# Patient Record
Sex: MALE | Race: BLACK OR AFRICAN AMERICAN | Employment: UNEMPLOYED | URBAN - METROPOLITAN AREA
[De-identification: names, ages, dates, MRNs, and addresses within clinical notes are randomized per-mention and may not be internally consistent; named-entity substitution may affect disease eponyms.]

---

## 2019-07-23 ENCOUNTER — HOSPITAL ENCOUNTER (OUTPATIENT)
Dept: LAB | Facility: CLINIC | Age: 69
Discharge: HOME | End: 2019-07-23
Attending: ORTHOPAEDIC SURGERY | Admitting: ORTHOPAEDIC SURGERY
Payer: MEDICARE

## 2019-07-23 DIAGNOSIS — Z96.659 S/P TOTAL KNEE REPLACEMENT: ICD-10-CM

## 2019-07-23 DIAGNOSIS — Z00.00 ROUTINE GENERAL MEDICAL EXAMINATION AT A HEALTH CARE FACILITY: Primary | ICD-10-CM

## 2019-07-23 LAB
BASOPHILS # BLD AUTO: 0.1 10E9/L (ref 0–0.2)
BASOPHILS NFR BLD AUTO: 0.6 %
CRP SERPL-MCNC: 15.6 MG/L (ref 0–8)
DIFFERENTIAL METHOD BLD: ABNORMAL
EOSINOPHIL # BLD AUTO: 0.3 10E9/L (ref 0–0.7)
EOSINOPHIL NFR BLD AUTO: 3.1 %
ERYTHROCYTE [DISTWIDTH] IN BLOOD BY AUTOMATED COUNT: 22.2 % (ref 10–15)
ERYTHROCYTE [SEDIMENTATION RATE] IN BLOOD BY WESTERGREN METHOD: 15 MM/H (ref 0–20)
HCT VFR BLD AUTO: 38.8 % (ref 40–53)
HGB BLD-MCNC: 11.4 G/DL (ref 13.3–17.7)
IMM GRANULOCYTES # BLD: 0 10E9/L (ref 0–0.4)
IMM GRANULOCYTES NFR BLD: 0.3 %
LYMPHOCYTES # BLD AUTO: 2.7 10E9/L (ref 0.8–5.3)
LYMPHOCYTES NFR BLD AUTO: 28.6 %
MCH RBC QN AUTO: 23.2 PG (ref 26.5–33)
MCHC RBC AUTO-ENTMCNC: 29.4 G/DL (ref 31.5–36.5)
MCV RBC AUTO: 79 FL (ref 78–100)
MONOCYTES # BLD AUTO: 0.7 10E9/L (ref 0–1.3)
MONOCYTES NFR BLD AUTO: 7.2 %
NEUTROPHILS # BLD AUTO: 5.6 10E9/L (ref 1.6–8.3)
NEUTROPHILS NFR BLD AUTO: 60.2 %
NRBC # BLD AUTO: 0 10*3/UL
NRBC BLD AUTO-RTO: 0 /100
PLATELET # BLD AUTO: 237 10E9/L (ref 150–450)
RBC # BLD AUTO: 4.91 10E12/L (ref 4.4–5.9)
WBC # BLD AUTO: 9.3 10E9/L (ref 4–11)

## 2019-07-23 PROCEDURE — 85652 RBC SED RATE AUTOMATED: CPT | Performed by: ORTHOPAEDIC SURGERY

## 2019-07-23 PROCEDURE — 36415 COLL VENOUS BLD VENIPUNCTURE: CPT | Performed by: ORTHOPAEDIC SURGERY

## 2019-07-23 PROCEDURE — 86140 C-REACTIVE PROTEIN: CPT | Performed by: ORTHOPAEDIC SURGERY

## 2019-07-23 PROCEDURE — 85025 COMPLETE CBC W/AUTO DIFF WBC: CPT | Performed by: ORTHOPAEDIC SURGERY

## 2019-08-01 ENCOUNTER — HOSPITAL ENCOUNTER (INPATIENT)
Facility: CLINIC | Age: 69
Setting detail: SURGERY ADMIT
End: 2019-08-01
Attending: ORTHOPAEDIC SURGERY | Admitting: ORTHOPAEDIC SURGERY
Payer: MEDICARE

## 2019-09-10 RX ORDER — IPRATROPIUM BROMIDE AND ALBUTEROL SULFATE 2.5; .5 MG/3ML; MG/3ML
1 SOLUTION RESPIRATORY (INHALATION) EVERY 4 HOURS PRN
COMMUNITY
End: 2019-10-01 | Stop reason: HOSPADM

## 2019-09-10 RX ORDER — DIPHENHYDRAMINE HCL 25 MG
25 TABLET ORAL EVERY 6 HOURS PRN
COMMUNITY
End: 2019-10-01 | Stop reason: HOSPADM

## 2019-09-10 RX ORDER — HYDROCODONE BITARTRATE AND ACETAMINOPHEN 5; 325 MG/1; MG/1
1-2 TABLET ORAL EVERY 6 HOURS PRN
COMMUNITY
End: 2019-10-01 | Stop reason: HOSPADM

## 2019-09-27 VITALS — WEIGHT: 275 LBS | HEIGHT: 69 IN | BODY MASS INDEX: 40.73 KG/M2

## 2019-09-27 ASSESSMENT — MIFFLIN-ST. JEOR: SCORE: 2007.77

## 2019-09-28 NOTE — PHARMACY-ADMISSION MEDICATION HISTORY
Medication reconciliation interview completed by pre-admitting nurse, reviewed by pharmacy. No further clarifications needed.     Last Reviewed by Sandie Winter RN on 9/10/2019 at 9:55 AM    Prior to Admission medications    Medication Sig Last Dose Taking? Auth Provider   clonazePAM (KLONOPIN) 1 MG tablet Take 1 mg by mouth 3 times daily as needed for anxiety  Yes Reported, Patient   diphenhydrAMINE (BENADRYL) 25 MG tablet Take 25 mg by mouth every 6 hours as needed for itching or allergies  Yes Reported, Patient   HYDROcodone-acetaminophen (NORCO) 5-325 MG tablet Take 1-2 tablets by mouth every 6 hours as needed for severe pain  Yes Reported, Patient   ipratropium - albuterol 0.5 mg/2.5 mg/3 mL (DUONEB) 0.5-2.5 (3) MG/3ML neb solution Take 1 vial by nebulization every 4 hours as needed for shortness of breath / dyspnea or wheezing  Yes Reported, Patient   Ipratropium-Albuterol (COMBIVENT RESPIMAT)  MCG/ACT inhaler Inhale 1 puff into the lungs 4 times daily  Yes Reported, Patient   METOPROLOL TARTRATE PO Take 50 mg by mouth 2 times daily  Yes Reported, Patient   triamterene-hydrochlorothiazide (MAXZIDE-25) 37.5-25 MG per tablet Take 1 tablet by mouth daily  Yes Reported, Patient

## 2019-10-31 RX ORDER — BUDESONIDE AND FORMOTEROL FUMARATE DIHYDRATE 80; 4.5 UG/1; UG/1
2 AEROSOL RESPIRATORY (INHALATION) 2 TIMES DAILY
COMMUNITY

## 2019-10-31 RX ORDER — HYDROCODONE BITARTRATE AND ACETAMINOPHEN 5; 325 MG/1; MG/1
1 TABLET ORAL EVERY 6 HOURS PRN
Status: ON HOLD | COMMUNITY
End: 2019-11-13

## 2019-10-31 RX ORDER — GUAIFENESIN AND DEXTROMETHORPHAN HYDROBROMIDE 600; 30 MG/1; MG/1
1 TABLET, EXTENDED RELEASE ORAL EVERY 12 HOURS
COMMUNITY

## 2019-11-08 ASSESSMENT — MIFFLIN-ST. JEOR: SCORE: 2029.31

## 2019-11-08 NOTE — PHARMACY-ADMISSION MEDICATION HISTORY
Admission medication history interview status for this patient is complete. See [facility] admission navigator for allergy information, prior to admission medications and immunization status.     PTA meds completed by pre-admitting nurse, Jimena Sanchez RN, and reviewed by pharmacy.      Prior to Admission medications    Medication Sig Last Dose Taking? Auth Provider   budesonide-formoterol (SYMBICORT) 80-4.5 MCG/ACT Inhaler Inhale 2 puffs into the lungs 2 times daily And may take additional 1-2 puffs per day as needed  Yes Reported, Patient   clonazePAM (KLONOPIN) 1 MG tablet Take 1 mg by mouth 3 times daily as needed for anxiety  Yes Reported, Patient   dextromethorphan-guaiFENesin (MUCINEX DM)  MG 12 hr tablet Take 1 tablet by mouth every 12 hours  Yes Reported, Patient   HYDROcodone-acetaminophen (NORCO) 5-325 MG tablet Take 1 tablet by mouth every 6 hours as needed for severe pain  Yes Reported, Patient   METOPROLOL TARTRATE PO Take 50 mg by mouth 2 times daily  Yes Reported, Patient   triamterene-hydrochlorothiazide (MAXZIDE-25) 37.5-25 MG per tablet Take 1 tablet by mouth daily  Yes Reported, Patient            No

## 2019-11-12 ENCOUNTER — ANESTHESIA (OUTPATIENT)
Dept: SURGERY | Facility: CLINIC | Age: 69
DRG: 467 | End: 2019-11-12
Payer: MEDICARE

## 2019-11-12 ENCOUNTER — HOSPITAL ENCOUNTER (INPATIENT)
Facility: CLINIC | Age: 69
LOS: 3 days | Discharge: HOME | DRG: 467 | End: 2019-11-15
Attending: ORTHOPAEDIC SURGERY | Admitting: ORTHOPAEDIC SURGERY
Payer: MEDICARE

## 2019-11-12 ENCOUNTER — APPOINTMENT (OUTPATIENT)
Dept: GENERAL RADIOLOGY | Facility: CLINIC | Age: 69
DRG: 467 | End: 2019-11-12
Attending: ORTHOPAEDIC SURGERY
Payer: MEDICARE

## 2019-11-12 ENCOUNTER — ANESTHESIA EVENT (OUTPATIENT)
Dept: SURGERY | Facility: CLINIC | Age: 69
DRG: 467 | End: 2019-11-12
Payer: MEDICARE

## 2019-11-12 DIAGNOSIS — D62 ANEMIA DUE TO BLOOD LOSS, ACUTE: ICD-10-CM

## 2019-11-12 DIAGNOSIS — Z96.651 STATUS POST TOTAL RIGHT KNEE REPLACEMENT: Primary | ICD-10-CM

## 2019-11-12 DIAGNOSIS — F41.9 ANXIETY: ICD-10-CM

## 2019-11-12 PROBLEM — Z96.659 S/P TOTAL KNEE ARTHROPLASTY: Status: ACTIVE | Noted: 2019-11-12

## 2019-11-12 LAB
ABO + RH BLD: NORMAL
ABO + RH BLD: NORMAL
BLD GP AB SCN SERPL QL: NORMAL
BLOOD BANK CMNT PATIENT-IMP: NORMAL
CREAT SERPL-MCNC: 1.18 MG/DL (ref 0.66–1.25)
GFR SERPL CREATININE-BSD FRML MDRD: 63 ML/MIN/{1.73_M2}
GRAM STN SPEC: NORMAL
HGB BLD-MCNC: 10.4 G/DL (ref 13.3–17.7)
PLATELET # BLD AUTO: 300 10E9/L (ref 150–450)
SPECIMEN EXP DATE BLD: NORMAL
SPECIMEN SOURCE: NORMAL

## 2019-11-12 PROCEDURE — C1776 JOINT DEVICE (IMPLANTABLE): HCPCS | Performed by: ORTHOPAEDIC SURGERY

## 2019-11-12 PROCEDURE — 71000012 ZZH RECOVERY PHASE 1 LEVEL 1 FIRST HR: Performed by: ORTHOPAEDIC SURGERY

## 2019-11-12 PROCEDURE — 87070 CULTURE OTHR SPECIMN AEROBIC: CPT | Performed by: ORTHOPAEDIC SURGERY

## 2019-11-12 PROCEDURE — 36000069 ZZH SURGERY LEVEL 5 EA 15 ADDTL MIN: Performed by: ORTHOPAEDIC SURGERY

## 2019-11-12 PROCEDURE — 86850 RBC ANTIBODY SCREEN: CPT | Performed by: ANESTHESIOLOGY

## 2019-11-12 PROCEDURE — 25000128 H RX IP 250 OP 636: Performed by: NURSE ANESTHETIST, CERTIFIED REGISTERED

## 2019-11-12 PROCEDURE — 37000008 ZZH ANESTHESIA TECHNICAL FEE, 1ST 30 MIN: Performed by: ORTHOPAEDIC SURGERY

## 2019-11-12 PROCEDURE — 25000132 ZZH RX MED GY IP 250 OP 250 PS 637: Mod: GY | Performed by: PHYSICIAN ASSISTANT

## 2019-11-12 PROCEDURE — 0SPC0JZ REMOVAL OF SYNTHETIC SUBSTITUTE FROM RIGHT KNEE JOINT, OPEN APPROACH: ICD-10-PCS | Performed by: ORTHOPAEDIC SURGERY

## 2019-11-12 PROCEDURE — 25000125 ZZHC RX 250: Performed by: NURSE ANESTHETIST, CERTIFIED REGISTERED

## 2019-11-12 PROCEDURE — 25800025 ZZH RX 258: Performed by: ORTHOPAEDIC SURGERY

## 2019-11-12 PROCEDURE — 88331 PATH CONSLTJ SURG 1 BLK 1SPC: CPT | Performed by: ORTHOPAEDIC SURGERY

## 2019-11-12 PROCEDURE — 88305 TISSUE EXAM BY PATHOLOGIST: CPT | Mod: 26 | Performed by: ORTHOPAEDIC SURGERY

## 2019-11-12 PROCEDURE — 36415 COLL VENOUS BLD VENIPUNCTURE: CPT | Performed by: ANESTHESIOLOGY

## 2019-11-12 PROCEDURE — 87075 CULTR BACTERIA EXCEPT BLOOD: CPT | Performed by: ORTHOPAEDIC SURGERY

## 2019-11-12 PROCEDURE — 87176 TISSUE HOMOGENIZATION CULTR: CPT | Performed by: ORTHOPAEDIC SURGERY

## 2019-11-12 PROCEDURE — 25800030 ZZH RX IP 258 OP 636: Performed by: ANESTHESIOLOGY

## 2019-11-12 PROCEDURE — 36000067 ZZH SURGERY LEVEL 5 1ST 30 MIN: Performed by: ORTHOPAEDIC SURGERY

## 2019-11-12 PROCEDURE — 88332 PATH CONSLTJ SURG EA ADD BLK: CPT | Mod: 26 | Performed by: ORTHOPAEDIC SURGERY

## 2019-11-12 PROCEDURE — 25000128 H RX IP 250 OP 636: Performed by: ORTHOPAEDIC SURGERY

## 2019-11-12 PROCEDURE — 86900 BLOOD TYPING SEROLOGIC ABO: CPT | Performed by: ANESTHESIOLOGY

## 2019-11-12 PROCEDURE — 25000128 H RX IP 250 OP 636: Performed by: ANESTHESIOLOGY

## 2019-11-12 PROCEDURE — 25000132 ZZH RX MED GY IP 250 OP 250 PS 637: Mod: GY | Performed by: ORTHOPAEDIC SURGERY

## 2019-11-12 PROCEDURE — 27211024 ZZHC OR SUPPLY OTHER OPNP: Performed by: ORTHOPAEDIC SURGERY

## 2019-11-12 PROCEDURE — 88331 PATH CONSLTJ SURG 1 BLK 1SPC: CPT | Mod: 26 | Performed by: ORTHOPAEDIC SURGERY

## 2019-11-12 PROCEDURE — 37000009 ZZH ANESTHESIA TECHNICAL FEE, EACH ADDTL 15 MIN: Performed by: ORTHOPAEDIC SURGERY

## 2019-11-12 PROCEDURE — 0SRC0J9 REPLACEMENT OF RIGHT KNEE JOINT WITH SYNTHETIC SUBSTITUTE, CEMENTED, OPEN APPROACH: ICD-10-PCS | Performed by: ORTHOPAEDIC SURGERY

## 2019-11-12 PROCEDURE — 40000986 XR KNEE PORT RT 1/2 VW: Mod: RT

## 2019-11-12 PROCEDURE — 87205 SMEAR GRAM STAIN: CPT | Performed by: ORTHOPAEDIC SURGERY

## 2019-11-12 PROCEDURE — 71000013 ZZH RECOVERY PHASE 1 LEVEL 1 EA ADDTL HR: Performed by: ORTHOPAEDIC SURGERY

## 2019-11-12 PROCEDURE — 12000000 ZZH R&B MED SURG/OB

## 2019-11-12 PROCEDURE — 82565 ASSAY OF CREATININE: CPT | Performed by: ANESTHESIOLOGY

## 2019-11-12 PROCEDURE — 88305 TISSUE EXAM BY PATHOLOGIST: CPT | Performed by: ORTHOPAEDIC SURGERY

## 2019-11-12 PROCEDURE — 88332 PATH CONSLTJ SURG EA ADD BLK: CPT | Performed by: ORTHOPAEDIC SURGERY

## 2019-11-12 PROCEDURE — 86901 BLOOD TYPING SEROLOGIC RH(D): CPT | Performed by: ANESTHESIOLOGY

## 2019-11-12 PROCEDURE — 85018 HEMOGLOBIN: CPT | Performed by: ANESTHESIOLOGY

## 2019-11-12 PROCEDURE — 85049 AUTOMATED PLATELET COUNT: CPT | Performed by: ANESTHESIOLOGY

## 2019-11-12 PROCEDURE — 25800030 ZZH RX IP 258 OP 636: Performed by: NURSE ANESTHETIST, CERTIFIED REGISTERED

## 2019-11-12 PROCEDURE — 25800030 ZZH RX IP 258 OP 636: Performed by: ORTHOPAEDIC SURGERY

## 2019-11-12 PROCEDURE — 27210794 ZZH OR GENERAL SUPPLY STERILE: Performed by: ORTHOPAEDIC SURGERY

## 2019-11-12 PROCEDURE — 27810169 ZZH OR IMPLANT GENERAL: Performed by: ORTHOPAEDIC SURGERY

## 2019-11-12 PROCEDURE — 40000171 ZZH STATISTIC PRE-PROCEDURE ASSESSMENT III: Performed by: ORTHOPAEDIC SURGERY

## 2019-11-12 DEVICE — IMPLANTABLE DEVICE: Type: IMPLANTABLE DEVICE | Site: KNEE | Status: FUNCTIONAL

## 2019-11-12 DEVICE — BONE CEMENT SIMPLEX W/TOBRAMYCIN 6197-9-001: Type: IMPLANTABLE DEVICE | Site: KNEE | Status: FUNCTIONAL

## 2019-11-12 RX ORDER — LIDOCAINE HYDROCHLORIDE 10 MG/ML
INJECTION, SOLUTION INFILTRATION; PERINEURAL PRN
Status: DISCONTINUED | OUTPATIENT
Start: 2019-11-12 | End: 2019-11-12

## 2019-11-12 RX ORDER — LIDOCAINE 40 MG/G
CREAM TOPICAL
Status: DISCONTINUED | OUTPATIENT
Start: 2019-11-12 | End: 2019-11-15 | Stop reason: HOSPADM

## 2019-11-12 RX ORDER — ONDANSETRON 2 MG/ML
4 INJECTION INTRAMUSCULAR; INTRAVENOUS EVERY 6 HOURS PRN
Status: DISCONTINUED | OUTPATIENT
Start: 2019-11-12 | End: 2019-11-15 | Stop reason: HOSPADM

## 2019-11-12 RX ORDER — LABETALOL 20 MG/4 ML (5 MG/ML) INTRAVENOUS SYRINGE
10
Status: DISCONTINUED | OUTPATIENT
Start: 2019-11-12 | End: 2019-11-12 | Stop reason: HOSPADM

## 2019-11-12 RX ORDER — EPHEDRINE SULFATE 50 MG/ML
INJECTION, SOLUTION INTRAMUSCULAR; INTRAVENOUS; SUBCUTANEOUS PRN
Status: DISCONTINUED | OUTPATIENT
Start: 2019-11-12 | End: 2019-11-12

## 2019-11-12 RX ORDER — ONDANSETRON 2 MG/ML
INJECTION INTRAMUSCULAR; INTRAVENOUS PRN
Status: DISCONTINUED | OUTPATIENT
Start: 2019-11-12 | End: 2019-11-12

## 2019-11-12 RX ORDER — CLONAZEPAM 1 MG/1
1 TABLET ORAL 3 TIMES DAILY PRN
Status: DISCONTINUED | OUTPATIENT
Start: 2019-11-12 | End: 2019-11-15 | Stop reason: HOSPADM

## 2019-11-12 RX ORDER — GLYCOPYRROLATE 0.2 MG/ML
INJECTION, SOLUTION INTRAMUSCULAR; INTRAVENOUS PRN
Status: DISCONTINUED | OUTPATIENT
Start: 2019-11-12 | End: 2019-11-12

## 2019-11-12 RX ORDER — CEFAZOLIN SODIUM 1 G/3ML
1 INJECTION, POWDER, FOR SOLUTION INTRAMUSCULAR; INTRAVENOUS SEE ADMIN INSTRUCTIONS
Status: DISCONTINUED | OUTPATIENT
Start: 2019-11-12 | End: 2019-11-15 | Stop reason: HOSPADM

## 2019-11-12 RX ORDER — DEXAMETHASONE SODIUM PHOSPHATE 4 MG/ML
INJECTION, SOLUTION INTRA-ARTICULAR; INTRALESIONAL; INTRAMUSCULAR; INTRAVENOUS; SOFT TISSUE PRN
Status: DISCONTINUED | OUTPATIENT
Start: 2019-11-12 | End: 2019-11-12

## 2019-11-12 RX ORDER — HYDROXYZINE HYDROCHLORIDE 25 MG/1
25 TABLET, FILM COATED ORAL EVERY 6 HOURS PRN
Status: DISCONTINUED | OUTPATIENT
Start: 2019-11-12 | End: 2019-11-13

## 2019-11-12 RX ORDER — NALOXONE HYDROCHLORIDE 0.4 MG/ML
.1-.4 INJECTION, SOLUTION INTRAMUSCULAR; INTRAVENOUS; SUBCUTANEOUS
Status: DISCONTINUED | OUTPATIENT
Start: 2019-11-12 | End: 2019-11-15 | Stop reason: HOSPADM

## 2019-11-12 RX ORDER — GUAIFENESIN AND DEXTROMETHORPHAN HYDROBROMIDE 600; 30 MG/1; MG/1
1 TABLET, EXTENDED RELEASE ORAL EVERY 12 HOURS PRN
Status: DISCONTINUED | OUTPATIENT
Start: 2019-11-12 | End: 2019-11-15 | Stop reason: HOSPADM

## 2019-11-12 RX ORDER — FENTANYL CITRATE 50 UG/ML
25-50 INJECTION, SOLUTION INTRAMUSCULAR; INTRAVENOUS
Status: DISCONTINUED | OUTPATIENT
Start: 2019-11-12 | End: 2019-11-12 | Stop reason: HOSPADM

## 2019-11-12 RX ORDER — HYDRALAZINE HYDROCHLORIDE 20 MG/ML
2.5-5 INJECTION INTRAMUSCULAR; INTRAVENOUS EVERY 10 MIN PRN
Status: DISCONTINUED | OUTPATIENT
Start: 2019-11-12 | End: 2019-11-12 | Stop reason: HOSPADM

## 2019-11-12 RX ORDER — ACETAMINOPHEN 325 MG/1
975 TABLET ORAL ONCE
Status: COMPLETED | OUTPATIENT
Start: 2019-11-12 | End: 2019-11-12

## 2019-11-12 RX ORDER — CEFAZOLIN SODIUM 2 G/100ML
2 INJECTION, SOLUTION INTRAVENOUS EVERY 8 HOURS
Status: COMPLETED | OUTPATIENT
Start: 2019-11-12 | End: 2019-11-13

## 2019-11-12 RX ORDER — CELECOXIB 200 MG/1
400 CAPSULE ORAL ONCE
Status: DISCONTINUED | OUTPATIENT
Start: 2019-11-12 | End: 2019-11-15 | Stop reason: HOSPADM

## 2019-11-12 RX ORDER — FENTANYL CITRATE 50 UG/ML
INJECTION, SOLUTION INTRAMUSCULAR; INTRAVENOUS PRN
Status: DISCONTINUED | OUTPATIENT
Start: 2019-11-12 | End: 2019-11-12

## 2019-11-12 RX ORDER — GLYCINE 1.5 G/100ML
SOLUTION IRRIGATION PRN
Status: DISCONTINUED | OUTPATIENT
Start: 2019-11-12 | End: 2019-11-12 | Stop reason: HOSPADM

## 2019-11-12 RX ORDER — PROPOFOL 10 MG/ML
INJECTION, EMULSION INTRAVENOUS PRN
Status: DISCONTINUED | OUTPATIENT
Start: 2019-11-12 | End: 2019-11-12

## 2019-11-12 RX ORDER — TRIAMTERENE/HYDROCHLOROTHIAZID 37.5-25 MG
1 TABLET ORAL DAILY
Status: DISCONTINUED | OUTPATIENT
Start: 2019-11-13 | End: 2019-11-15 | Stop reason: HOSPADM

## 2019-11-12 RX ORDER — POLYETHYLENE GLYCOL 3350 17 G/17G
17 POWDER, FOR SOLUTION ORAL DAILY
Status: DISCONTINUED | OUTPATIENT
Start: 2019-11-13 | End: 2019-11-15 | Stop reason: HOSPADM

## 2019-11-12 RX ORDER — BISACODYL 10 MG
10 SUPPOSITORY, RECTAL RECTAL DAILY PRN
Status: DISCONTINUED | OUTPATIENT
Start: 2019-11-12 | End: 2019-11-15 | Stop reason: HOSPADM

## 2019-11-12 RX ORDER — ONDANSETRON 4 MG/1
4 TABLET, ORALLY DISINTEGRATING ORAL EVERY 6 HOURS PRN
Status: DISCONTINUED | OUTPATIENT
Start: 2019-11-12 | End: 2019-11-15 | Stop reason: HOSPADM

## 2019-11-12 RX ORDER — HYDROXYZINE HYDROCHLORIDE 25 MG/1
25 TABLET, FILM COATED ORAL 3 TIMES DAILY PRN
Status: DISCONTINUED | OUTPATIENT
Start: 2019-11-12 | End: 2019-11-13 | Stop reason: DRUGHIGH

## 2019-11-12 RX ORDER — CEFAZOLIN SODIUM IN 0.9 % NACL 3 G/100 ML
3 INTRAVENOUS SOLUTION, PIGGYBACK (ML) INTRAVENOUS
Status: COMPLETED | OUTPATIENT
Start: 2019-11-12 | End: 2019-11-12

## 2019-11-12 RX ORDER — ONDANSETRON 2 MG/ML
4 INJECTION INTRAMUSCULAR; INTRAVENOUS EVERY 6 HOURS
Status: DISPENSED | OUTPATIENT
Start: 2019-11-12 | End: 2019-11-13

## 2019-11-12 RX ORDER — AMOXICILLIN 250 MG
2 CAPSULE ORAL 2 TIMES DAILY
Status: DISCONTINUED | OUTPATIENT
Start: 2019-11-12 | End: 2019-11-15 | Stop reason: HOSPADM

## 2019-11-12 RX ORDER — ACETAMINOPHEN 325 MG/1
650 TABLET ORAL EVERY 4 HOURS PRN
Status: DISCONTINUED | OUTPATIENT
Start: 2019-11-15 | End: 2019-11-15 | Stop reason: HOSPADM

## 2019-11-12 RX ORDER — SODIUM CHLORIDE, SODIUM LACTATE, POTASSIUM CHLORIDE, CALCIUM CHLORIDE 600; 310; 30; 20 MG/100ML; MG/100ML; MG/100ML; MG/100ML
INJECTION, SOLUTION INTRAVENOUS CONTINUOUS
Status: DISCONTINUED | OUTPATIENT
Start: 2019-11-12 | End: 2019-11-12 | Stop reason: HOSPADM

## 2019-11-12 RX ORDER — ONDANSETRON 4 MG/1
4 TABLET, ORALLY DISINTEGRATING ORAL EVERY 30 MIN PRN
Status: DISCONTINUED | OUTPATIENT
Start: 2019-11-12 | End: 2019-11-12 | Stop reason: HOSPADM

## 2019-11-12 RX ORDER — OXYCODONE HYDROCHLORIDE 5 MG/1
5-10 TABLET ORAL
Status: DISCONTINUED | OUTPATIENT
Start: 2019-11-12 | End: 2019-11-15 | Stop reason: HOSPADM

## 2019-11-12 RX ORDER — ACETAMINOPHEN 325 MG/1
975 TABLET ORAL EVERY 8 HOURS
Status: DISCONTINUED | OUTPATIENT
Start: 2019-11-12 | End: 2019-11-15 | Stop reason: HOSPADM

## 2019-11-12 RX ORDER — METOPROLOL TARTRATE 50 MG
50 TABLET ORAL 2 TIMES DAILY
Status: DISCONTINUED | OUTPATIENT
Start: 2019-11-12 | End: 2019-11-15 | Stop reason: HOSPADM

## 2019-11-12 RX ORDER — NALOXONE HYDROCHLORIDE 0.4 MG/ML
.1-.4 INJECTION, SOLUTION INTRAMUSCULAR; INTRAVENOUS; SUBCUTANEOUS
Status: ACTIVE | OUTPATIENT
Start: 2019-11-12 | End: 2019-11-13

## 2019-11-12 RX ORDER — ONDANSETRON 2 MG/ML
4 INJECTION INTRAMUSCULAR; INTRAVENOUS EVERY 30 MIN PRN
Status: DISCONTINUED | OUTPATIENT
Start: 2019-11-12 | End: 2019-11-12 | Stop reason: HOSPADM

## 2019-11-12 RX ORDER — SODIUM CHLORIDE, SODIUM LACTATE, POTASSIUM CHLORIDE, CALCIUM CHLORIDE 600; 310; 30; 20 MG/100ML; MG/100ML; MG/100ML; MG/100ML
INJECTION, SOLUTION INTRAVENOUS CONTINUOUS
Status: DISCONTINUED | OUTPATIENT
Start: 2019-11-12 | End: 2019-11-14

## 2019-11-12 RX ORDER — SODIUM CHLORIDE 9 MG/ML
INJECTION, SOLUTION INTRAVENOUS CONTINUOUS
Status: DISCONTINUED | OUTPATIENT
Start: 2019-11-12 | End: 2019-11-14

## 2019-11-12 RX ORDER — HYDROMORPHONE HYDROCHLORIDE 1 MG/ML
0.2 INJECTION, SOLUTION INTRAMUSCULAR; INTRAVENOUS; SUBCUTANEOUS
Status: DISCONTINUED | OUTPATIENT
Start: 2019-11-12 | End: 2019-11-15 | Stop reason: HOSPADM

## 2019-11-12 RX ORDER — LIDOCAINE 40 MG/G
CREAM TOPICAL
Status: DISCONTINUED | OUTPATIENT
Start: 2019-11-12 | End: 2019-11-12

## 2019-11-12 RX ADMIN — ROCURONIUM BROMIDE 40 MG: 10 INJECTION INTRAVENOUS at 12:44

## 2019-11-12 RX ADMIN — SODIUM CHLORIDE, POTASSIUM CHLORIDE, SODIUM LACTATE AND CALCIUM CHLORIDE: 600; 310; 30; 20 INJECTION, SOLUTION INTRAVENOUS at 12:39

## 2019-11-12 RX ADMIN — TRANEXAMIC ACID 1 G: 1 INJECTION, SOLUTION INTRAVENOUS at 12:59

## 2019-11-12 RX ADMIN — HYDROMORPHONE HYDROCHLORIDE 0.5 MG: 1 INJECTION, SOLUTION INTRAMUSCULAR; INTRAVENOUS; SUBCUTANEOUS at 19:37

## 2019-11-12 RX ADMIN — CEFAZOLIN 1 G: 1 INJECTION, POWDER, FOR SOLUTION INTRAMUSCULAR; INTRAVENOUS at 14:35

## 2019-11-12 RX ADMIN — FENTANYL CITRATE 50 MCG: 50 INJECTION, SOLUTION INTRAMUSCULAR; INTRAVENOUS at 14:16

## 2019-11-12 RX ADMIN — FENTANYL CITRATE 50 MCG: 50 INJECTION, SOLUTION INTRAMUSCULAR; INTRAVENOUS at 15:56

## 2019-11-12 RX ADMIN — TRANEXAMIC ACID 1 G: 1 INJECTION, SOLUTION INTRAVENOUS at 16:07

## 2019-11-12 RX ADMIN — FENTANYL CITRATE 75 MCG: 50 INJECTION, SOLUTION INTRAMUSCULAR; INTRAVENOUS at 13:17

## 2019-11-12 RX ADMIN — METOPROLOL TARTRATE 50 MG: 50 TABLET, FILM COATED ORAL at 22:46

## 2019-11-12 RX ADMIN — LIDOCAINE HYDROCHLORIDE 40 MG: 10 INJECTION, SOLUTION INFILTRATION; PERINEURAL at 12:44

## 2019-11-12 RX ADMIN — ONDANSETRON HYDROCHLORIDE 4 MG: 2 INJECTION, SOLUTION INTRAVENOUS at 15:59

## 2019-11-12 RX ADMIN — GLYCOPYRROLATE 0.2 MG: 0.2 INJECTION, SOLUTION INTRAMUSCULAR; INTRAVENOUS at 12:44

## 2019-11-12 RX ADMIN — ACETAMINOPHEN 975 MG: 325 TABLET, FILM COATED ORAL at 12:30

## 2019-11-12 RX ADMIN — HYDROMORPHONE HYDROCHLORIDE 0.5 MG: 1 INJECTION, SOLUTION INTRAMUSCULAR; INTRAVENOUS; SUBCUTANEOUS at 17:13

## 2019-11-12 RX ADMIN — MIDAZOLAM 2 MG: 1 INJECTION INTRAMUSCULAR; INTRAVENOUS at 12:39

## 2019-11-12 RX ADMIN — SODIUM CHLORIDE, POTASSIUM CHLORIDE, SODIUM LACTATE AND CALCIUM CHLORIDE: 600; 310; 30; 20 INJECTION, SOLUTION INTRAVENOUS at 14:22

## 2019-11-12 RX ADMIN — HYDROMORPHONE HYDROCHLORIDE 1 MG: 1 INJECTION, SOLUTION INTRAMUSCULAR; INTRAVENOUS; SUBCUTANEOUS at 14:19

## 2019-11-12 RX ADMIN — ROCURONIUM BROMIDE 10 MG: 10 INJECTION INTRAVENOUS at 12:59

## 2019-11-12 RX ADMIN — MIDAZOLAM 2 MG: 1 INJECTION INTRAMUSCULAR; INTRAVENOUS at 12:12

## 2019-11-12 RX ADMIN — PROPOFOL 200 MG: 10 INJECTION, EMULSION INTRAVENOUS at 12:44

## 2019-11-12 RX ADMIN — FENTANYL CITRATE 50 MCG: 50 INJECTION, SOLUTION INTRAMUSCULAR; INTRAVENOUS at 14:51

## 2019-11-12 RX ADMIN — HYDROMORPHONE HYDROCHLORIDE 0.5 MG: 1 INJECTION, SOLUTION INTRAMUSCULAR; INTRAVENOUS; SUBCUTANEOUS at 17:32

## 2019-11-12 RX ADMIN — OXYCODONE HYDROCHLORIDE 5 MG: 5 TABLET ORAL at 21:46

## 2019-11-12 RX ADMIN — DEXAMETHASONE SODIUM PHOSPHATE 4 MG: 4 INJECTION, SOLUTION INTRA-ARTICULAR; INTRALESIONAL; INTRAMUSCULAR; INTRAVENOUS; SOFT TISSUE at 12:44

## 2019-11-12 RX ADMIN — CEFAZOLIN SODIUM 2 G: 2 INJECTION, SOLUTION INTRAVENOUS at 21:47

## 2019-11-12 RX ADMIN — FENTANYL CITRATE 125 MCG: 50 INJECTION, SOLUTION INTRAMUSCULAR; INTRAVENOUS at 12:44

## 2019-11-12 RX ADMIN — SODIUM CHLORIDE 1000 ML: 9 INJECTION, SOLUTION INTRAVENOUS at 21:45

## 2019-11-12 RX ADMIN — Medication 3 G: at 12:39

## 2019-11-12 RX ADMIN — HYDROMORPHONE HYDROCHLORIDE 1 MG: 1 INJECTION, SOLUTION INTRAMUSCULAR; INTRAVENOUS; SUBCUTANEOUS at 13:07

## 2019-11-12 RX ADMIN — ACETAMINOPHEN 975 MG: 325 TABLET, FILM COATED ORAL at 21:46

## 2019-11-12 RX ADMIN — HYDROMORPHONE HYDROCHLORIDE 0.5 MG: 1 INJECTION, SOLUTION INTRAMUSCULAR; INTRAVENOUS; SUBCUTANEOUS at 19:11

## 2019-11-12 RX ADMIN — PHENYLEPHRINE HYDROCHLORIDE 100 MCG: 10 INJECTION INTRAVENOUS at 13:01

## 2019-11-12 RX ADMIN — ONDANSETRON HYDROCHLORIDE 4 MG: 2 INJECTION, SOLUTION INTRAMUSCULAR; INTRAVENOUS at 21:48

## 2019-11-12 RX ADMIN — Medication 5 MG: at 13:50

## 2019-11-12 RX ADMIN — SODIUM CHLORIDE: 9 INJECTION, SOLUTION INTRAVENOUS at 21:57

## 2019-11-12 ASSESSMENT — ACTIVITIES OF DAILY LIVING (ADL)
TRANSFERRING: 0-->INDEPENDENT
FALL_HISTORY_WITHIN_LAST_SIX_MONTHS: NO
AMBULATION: 1-->ASSISTIVE EQUIPMENT
SWALLOWING: 0-->SWALLOWS FOODS/LIQUIDS WITHOUT DIFFICULTY
BATHING: 1-->ASSISTIVE EQUIPMENT
RETIRED_EATING: 0-->INDEPENDENT
RETIRED_COMMUNICATION: 0-->UNDERSTANDS/COMMUNICATES WITHOUT DIFFICULTY
TOILETING: 0-->INDEPENDENT
COGNITION: 0 - NO COGNITION ISSUES REPORTED
DRESS: 0-->INDEPENDENT

## 2019-11-12 ASSESSMENT — COPD QUESTIONNAIRES: COPD: 1

## 2019-11-12 ASSESSMENT — LIFESTYLE VARIABLES: TOBACCO_USE: 1

## 2019-11-12 ASSESSMENT — MIFFLIN-ST. JEOR: SCORE: 2025.92

## 2019-11-12 NOTE — ANESTHESIA POSTPROCEDURE EVALUATION
Patient: Douglas Jin    Procedure(s):  Revision right total knee arthroplasty (choice anesthesia)    Diagnosis:failed right total knee  Diagnosis Additional Information: No value filed.    Anesthesia Type:  General, ETT, For Post-op pain in coordination with surgeon    Note:  Anesthesia Post Evaluation    Patient location during evaluation: PACU  Patient participation: Able to fully participate in evaluation  Level of consciousness: awake  Pain management: adequate  Airway patency: patent  Cardiovascular status: acceptable  Respiratory status: acceptable  Hydration status: acceptable  PONV: none             Last vitals:  Vitals:    11/12/19 1225 11/12/19 1628 11/12/19 1635   BP: 139/84 (!) 167/89 (!) 157/88   Pulse: 62  95   Resp: 18 16 12   Temp:  96.2  F (35.7  C)    SpO2: 98% 100% 100%         Electronically Signed By: Norberto Vega MD  November 12, 2019  4:44 PM

## 2019-11-12 NOTE — ANESTHESIA CARE TRANSFER NOTE
Patient: Douglas Jin    Procedure(s):  Revision right total knee arthroplasty (choice anesthesia)    Diagnosis: failed right total knee  Diagnosis Additional Information: No value filed.    Anesthesia Type:   General, ETT, For Post-op pain in coordination with surgeon     Note:  Airway :Face Mask  Patient transferred to:PACU  Handoff Report: Identifed the Patient, Identified the Reponsible Provider, Reviewed the pertinent medical history, Discussed the surgical course, Reviewed Intra-OP anesthesia mangement and issues during anesthesia, Set expectations for post-procedure period and Allowed opportunity for questions and acknowledgement of understandingpost-procedure handoff checklist not completed for medical reasons      Vitals: (Last set prior to Anesthesia Care Transfer)    CRNA VITALS  11/12/2019 1557 - 11/12/2019 1632      11/12/2019             Resp Rate (observed):  9                Electronically Signed By: Dean Dennis Severson, APRN CRNA  November 12, 2019  4:32 PM

## 2019-11-12 NOTE — ANESTHESIA PROCEDURE NOTES
Peripheral nerve/Neuraxial procedure note : Saphenous  Pre-Procedure  Performed by Norberto Vega MD  Referred by [facility]  Location: pre-op    Procedure Times:11/12/2019 12:12 PM and 11/12/2019 12:22 PM  Pre-Anesthestic Checklist: patient identified, IV checked, site marked, risks and benefits discussed, informed consent, monitors and equipment checked, pre-op evaluation, at physician/surgeon's request and post-op pain management    Timeout  Correct Patient: Yes   Correct Procedure: Yes   Correct Site: Yes   Correct Laterality: Yes   Correct Position: Yes   Site Marked: Yes   .   Procedure Documentation    Diagnosis:REV RT TKR.    Procedure: Saphenous, right.   Patient Position:supine Local skin infiltrated with 1 mL of 1% lidocaine.    Ultrasound used to identify targeted nerve, plexus, or vascular marker and placed a needle adjacent to it., Ultrasound was used to visualize the spread of the anesthetic in close proximity to the above stated nerve.   Patient Prep/Sterile Barriers; sterile gloves, povidone-iodine 7.5% surgical scrub.  .  Needle: insulated   Needle Gauge: 22.    Needle Length (Inches) 3.13   Insertion Method: Single Shot.        Assessment/Narrative  Paresthesias: No.  .  The placement was negative for: blood aspirated, painful injection and site bleeding.  Bolus given via needle..   Secured via.   Complications: none. Comments:  10cc each 2lido w/ epi and 0.5marc.The surgeon has given a verbal order transferring care of this patient to me for the performance of a regional analgesia block for post-op pain control. It is requested of me because I am uniquely trained and qualified to perform this block and the surgeon is neither trained nor qualified to perform this procedure.

## 2019-11-12 NOTE — ANESTHESIA PREPROCEDURE EVALUATION
Anesthesia Pre-Procedure Evaluation    Patient: Douglas Jin   MRN: [id number] : 1950          Preoperative Diagnosis: failed right total knee    Procedure(s):  Revision right total knee arthroplasty (choice anesthesia)    Past Medical History:   Diagnosis Date     Arthritis      Chronic back pain      Constipation      COPD (chronic obstructive pulmonary disease) (H)      History of blood transfusion      Hypertension      Other chronic pain     low back     Pneumonia     recent bronchitis 10/2019     Past Surgical History:   Procedure Laterality Date     ARTHROPLASTY KNEE Right 2015    Procedure: ARTHROPLASTY KNEE;  Surgeon: Eric Pierce MD;  Location: [facility] OR     FUSION SPINE POSTERIOR MINIMALLY INVASIVE ONE LEVEL      L4-L5     HERNIA REPAIR, INGUINAL RT/LT Left      SINUS SURGERY       TONSILLECTOMY      as a child     Anesthesia Evaluation     . Pt has had prior anesthetic.            ROS/MED HX    ENT/Pulmonary:     (+)tobacco use, COPD, , . .    Neurologic:       Cardiovascular:     (+) hypertension----. : . . . :. .       METS/Exercise Tolerance:     Hematologic:         Musculoskeletal:   (+) arthritis,  other musculoskeletal- LBP      GI/Hepatic:     (+) hepatitis type Alcoholic, Other GI/Hepatic etoh      Renal/Genitourinary:         Endo:     (+) Obesity, .      Psychiatric:     (+) psychiatric history anxiety      Infectious Disease:         Malignancy:         Other:    (+) H/O Chronic Pain,                        Physical Exam  Normal systems: cardiovascular and pulmonary    Airway   Mallampati: II  TM distance: >3 FB  Neck ROM: full    Dental     Cardiovascular       Pulmonary             Lab Results   Component Value Date    WBC 9.3 2019    HGB 11.4 (L) 2019    HCT 38.8 (L) 2019     2019    CRP 15.6 (H) 2019    SED 15 2019    CR 1.21 2015     (H) 2015       Preop Vitals  BP Readings from Last 3 Encounters:  "  11/12/19 (!) 158/104   08/01/15 131/61   08/11/13 (!) 177/108    Pulse Readings from Last 3 Encounters:   11/12/19 69      Resp Readings from Last 3 Encounters:   11/12/19 20   08/01/15 16   08/11/13 18    SpO2 Readings from Last 3 Encounters:   11/12/19 94%   08/01/15 94%   08/11/13 100%      Temp Readings from Last 1 Encounters:   11/12/19 97.7  F (36.5  C) (Temporal)    Ht Readings from Last 1 Encounters:   11/12/19 1.753 m (5' 9\")      Wt Readings from Last 1 Encounters:   11/12/19 126.6 kg (279 lb)    Estimated body mass index is 41.2 kg/m  as calculated from the following:    Height as of this encounter: 1.753 m (5' 9\").    Weight as of this encounter: 126.6 kg (279 lb).       Anesthesia Plan      History & Physical Review  History and physical reviewed and following examination; no interval change.    ASA Status:  3 .    NPO Status:  > 8 hours    Plan for General, ETT and For Post-op pain in coordination with surgeon with Intravenous and Propofol induction. Maintenance will be Balanced.    PONV prophylaxis:  Ondansetron (or other 5HT-3) and Dexamethasone or Solumedrol       Postoperative Care  Postoperative pain management:  IV analgesics.      Consents  Anesthetic plan, risks, benefits and alternatives discussed with:  Patient.  Use of blood products discussed: Yes.   Use of blood products discussed with Patient.  Consented to blood products.  .                 Norberto Vega MD                    .  "

## 2019-11-12 NOTE — BRIEF OP NOTE
[facility]    Brief Operative Note    Pre-operative diagnosis: failed right total knee  Post-operative diagnosis Same as pre-operative diagnosis    Procedure: Procedure(s):  Revision right total knee arthroplasty (choice anesthesia)  Surgeon: Surgeon(s) and Role:     * Eric Pierce MD - Primary     * Fatimah Henson PA-C - Assisting  Anesthesia: Combined General with Block   Estimated blood loss: Less than 50 ml  Drains: Hemovac  Specimens:   ID Type Source Tests Collected by Time Destination   1 : Right knee synovial fluid Fluid Knee, Right ANAEROBIC BACTERIAL CULTURE, FLUID CULTURE AEROBIC BACTERIAL, GRAM STAIN Eric Pierce MD 11/12/2019  1:21 PM    2 : Right knee synovium #1 Tissue Knee, Right ANAEROBIC BACTERIAL CULTURE, GRAM STAIN, TISSUE CULTURE AEROBIC BACTERIAL Eric Pierce MD 11/12/2019  1:25 PM    3 : Right knee synovium #2 Tissue Knee, Right ANAEROBIC BACTERIAL CULTURE, GRAM STAIN, TISSUE CULTURE AEROBIC BACTERIAL Eric Pierce MD 11/12/2019  1:25 PM    A : Right knee synovium - check for acute inflammation Tissue Knee, Right SURGICAL PATHOLOGY EXAM Eric Pierce MD 11/12/2019  1:29 PM    AREVISED : Right knee synovium - check for acute inflammation Tissue Knee, Right SURGICAL PATHOLOGY EXAM Eric Pierce MD 11/12/2019  1:29 PM      Findings:   None.  Complications: None.  Implants:   Implant Name Type Inv. Item Serial No.  Lot No. LRB No. Used   SIZE 7 TIBIAL TRAY    ARTHREX [id number] Right 1   SIZE 7/8MM TIBIAL INSERT    ARTHREX [id number] Right 1   SIZE 8 RIGHT FEMUR    ARTHREX [id number] Right 1   Simplex HV    PAULA [id number] Right 1   BONE CEMENT SIMPLEX W/TOBRAMYCIN 6197-9-001 Cement, Bone BONE CEMENT SIMPLEX W/TOBRAMYCIN 6197-9-001  [employer] [id number] Right 2   NexGen Complete Knee Solutions, Legacy Knee Constrained Condylar Femoral Component, Size G, Right Total Joint Component/Insert    BIOMET [id number] Right 1   NexGen Complete knee solution stemmed tibial component size 8    KATHERINE [id number] Right 1   NexGen Complete knee solution stem extension straight [id number]    KATHERINE [id number] Right 1   IMP STEM KNEE NEXGEN [id number] -714-18 Total Joint Component/Insert IMP STEM KNEE NEXGEN [id number] -345-18  KATHERINE [employer] [id number] Right 1   NexGen complete knee solution, legacy knee-constrained condylar knee(LCCK) articular surface 10mm    KATHERINE [id number] Right 1

## 2019-11-12 NOTE — OP NOTE
Procedure Date: 11/12/2019      PREOPERATIVE DIAGNOSIS:  Failed loose right total knee arthroplasty.      POSTOPERATIVE DIAGNOSIS:  Failed loose right total knee arthroplasty.      PROCEDURE:  Right total knee arthroplasty revision using a Casi LCCK implant.      SURGEON:  Blu Pierce MD.      FIRST ASSISTANT:  Fatimah Henson PA-C.      INDICATIONS FOR SURGERY:  Mr. Jin is a very pleasant 68-year-old male who had a right total knee arthroplasty done about 5 years ago.  He did well postoperatively, but over time has had increasing pain in his right tibia.  X-ray showed some lucencies around his right tibial implant as well as a bone scan that was positive for our tibial loosening.  We discussed treatment options.  Given his age, activity level and nature of this, I think he would benefit from revision of his right total knee arthroplasty.  Inflammatory markers were negative for acute inflammation and we discussed revision total knee arthroplasty.  He understands the risks, benefits and alternatives and wishes to proceed with surgery.      NARRATIVE EVENTS:  After thorough preoperative evaluation and proper identification of patient's extremity to be operated on, Mr. Jin was taken to the operating room where he underwent a general anesthetic, placed supine on the operating table and his right leg was prepped and draped in the usual sterile manner.  After appropriate surgical pause to confirm the patient's extremity to be operated on, 30 minutes after the patient received 2 grams of Ancef, his right leg was exsanguinated and tourniquet was raised to 300 mmHg on his right upper thigh.  We approached his right knee through the previous midline incision, ellipsing out his previous scar.  The skin and soft tissue were sharply dissected down to the patella where a medium parapatellar arthrotomy was performed.  We everted the patella, performed an extensive synovectomy.  Fluid was sent for culture as well as 2  tissue samples were sent for culture and a tissue sample was sent for frozen section analysis for acute inflammation.  This showed no evidence of acute inflammation.  We proceeded on with our total knee arthroplasty.  At this point, it was evident that his tibial implant was grossly loose.  His femoral implant was reasonably well fixated.  The patella was very well fixed.  The decision was made at this point, given that his patella was in reasonable position, to remove his femoral component which we did with minimal difficulty and then removed the tibial component which came off quite easily from his tibia and femur respectively.  We then paid our attention to the tibia, reaming the tibia up.  After removing all the excess cement from the knee, we reamed the tibia shaft up to fit a size 17 mm in diameter x 100 mm in length tibial stem.  Once this was done, we used the stem to make our perpendicular proximal tibial cut using an intramedullary guide.  Once this was done, we sized the proximal tibia.  It sized to fit a size 8 tibia with a 17 mm in diameter x 100 mm in length stem.  This gave us good alignment and bone.  We reamed and punched for the tibial keel and placed our trial implant into position, a size 8 femur with a 17 mm x 100 mm tibial stem.  This was solidly into position.  We paid our attention then to the femur here.  We opened the intramedullary canal and then sequentially reamed up to fit a size 18 x 100 mm femoral stem.  We sized the distal femur.  It sized to fit a size G femoral component and we made our distal femoral resection, resecting about 3 mm of distal femoral bone.  We made our box cut for our LCCK femoral component and placed our trial implant into position, a size G femur with an 18 x 100 mm femoral stem.  With a 10 mm thick polyethylene insert, this gave us good stability and full range of motion.  So at this point, we then thoroughly irrigated the knee and prepared to cement in our  final components.  Using 2 batches of Simplex cement with tobramycin, one in the femur and one in the tibia, we cemented primarily metaphyseal, starting with the tibia, a size 8 tibia with a 17 x 100 mm tibial stem and then with a second batch of cement, a size G femur with an 18 x 100 mm straight femoral stem.  Once the implants were in position, we removed all the excess cement from the knee.  We retrialed our polyethylene inserts, found that a 10 mm insert gave the best stability and full range of motion, so a 10 mm LCCK implant was then impacted and locked into position with the set screw.  The knee was reduced after thorough irrigation with both saline and IrriSept solution.  We then closed the arthrotomy with #1 and 0 Vicryl sutures.  We placed one drain deep to the arthrotomy, closed skin and soft tissue with absorbable sutures and staples in the skin.  The patient was placed in a well-padded postoperative dressing and taken to the recovery room in stable condition.  He tolerated the procedure without difficulty.         DEANDRE TIERNEY MD             D: 2019   T: 2019   MT: FOSTER      Name:     PORTIA LOPEZ   MRN:      [id number]        Account:        [id number]   :      1950           Procedure Date: 2019      Document: [id number]

## 2019-11-13 ENCOUNTER — APPOINTMENT (OUTPATIENT)
Dept: PHYSICAL THERAPY | Facility: CLINIC | Age: 69
DRG: 467 | End: 2019-11-13
Attending: ORTHOPAEDIC SURGERY
Payer: MEDICARE

## 2019-11-13 LAB
COPATH REPORT: NORMAL
HGB BLD-MCNC: 8.8 G/DL (ref 13.3–17.7)

## 2019-11-13 PROCEDURE — 25000132 ZZH RX MED GY IP 250 OP 250 PS 637: Mod: GY | Performed by: PHYSICIAN ASSISTANT

## 2019-11-13 PROCEDURE — 99222 1ST HOSP IP/OBS MODERATE 55: CPT | Performed by: PHYSICIAN ASSISTANT

## 2019-11-13 PROCEDURE — 97110 THERAPEUTIC EXERCISES: CPT | Mod: GP | Performed by: PHYSICAL THERAPIST

## 2019-11-13 PROCEDURE — 25000128 H RX IP 250 OP 636: Performed by: ORTHOPAEDIC SURGERY

## 2019-11-13 PROCEDURE — 12000000 ZZH R&B MED SURG/OB

## 2019-11-13 PROCEDURE — 85018 HEMOGLOBIN: CPT | Performed by: ORTHOPAEDIC SURGERY

## 2019-11-13 PROCEDURE — 36415 COLL VENOUS BLD VENIPUNCTURE: CPT | Performed by: ORTHOPAEDIC SURGERY

## 2019-11-13 PROCEDURE — 99207 ZZC CONSULT E&M CHANGED TO INITIAL LEVEL: CPT | Performed by: PHYSICIAN ASSISTANT

## 2019-11-13 PROCEDURE — 25000132 ZZH RX MED GY IP 250 OP 250 PS 637: Mod: GY | Performed by: ORTHOPAEDIC SURGERY

## 2019-11-13 PROCEDURE — 97530 THERAPEUTIC ACTIVITIES: CPT | Mod: GP | Performed by: PHYSICAL THERAPIST

## 2019-11-13 PROCEDURE — 97116 GAIT TRAINING THERAPY: CPT | Mod: GP | Performed by: PHYSICAL THERAPIST

## 2019-11-13 PROCEDURE — 25800030 ZZH RX IP 258 OP 636: Performed by: ORTHOPAEDIC SURGERY

## 2019-11-13 PROCEDURE — 97162 PT EVAL MOD COMPLEX 30 MIN: CPT | Mod: GP | Performed by: PHYSICAL THERAPIST

## 2019-11-13 RX ORDER — OXYCODONE AND ACETAMINOPHEN 5; 325 MG/1; MG/1
1-2 TABLET ORAL EVERY 4 HOURS PRN
Qty: 60 TABLET | Refills: 0 | Status: SHIPPED | OUTPATIENT
Start: 2019-11-13

## 2019-11-13 RX ORDER — AMOXICILLIN 250 MG
1 CAPSULE ORAL 2 TIMES DAILY
Qty: 30 TABLET | Refills: 0 | Status: SHIPPED | OUTPATIENT
Start: 2019-11-13

## 2019-11-13 RX ORDER — BUDESONIDE AND FORMOTEROL FUMARATE DIHYDRATE 80; 4.5 UG/1; UG/1
2 AEROSOL RESPIRATORY (INHALATION)
Status: DISCONTINUED | OUTPATIENT
Start: 2019-11-13 | End: 2019-11-13

## 2019-11-13 RX ORDER — HYDROXYZINE HYDROCHLORIDE 25 MG/1
25-50 TABLET, FILM COATED ORAL EVERY 6 HOURS PRN
Qty: 60 TABLET | Refills: 0 | Status: ON HOLD | OUTPATIENT
Start: 2019-11-13 | End: 2019-11-22

## 2019-11-13 RX ORDER — HYDROXYZINE HYDROCHLORIDE 25 MG/1
25-50 TABLET, FILM COATED ORAL EVERY 6 HOURS PRN
Status: DISCONTINUED | OUTPATIENT
Start: 2019-11-13 | End: 2019-11-15 | Stop reason: HOSPADM

## 2019-11-13 RX ORDER — BUDESONIDE AND FORMOTEROL FUMARATE DIHYDRATE 80; 4.5 UG/1; UG/1
2 AEROSOL RESPIRATORY (INHALATION)
Status: DISCONTINUED | OUTPATIENT
Start: 2019-11-13 | End: 2019-11-15 | Stop reason: HOSPADM

## 2019-11-13 RX ADMIN — ONDANSETRON HYDROCHLORIDE 4 MG: 2 INJECTION, SOLUTION INTRAMUSCULAR; INTRAVENOUS at 04:00

## 2019-11-13 RX ADMIN — HYDROXYZINE HYDROCHLORIDE 25 MG: 25 TABLET, FILM COATED ORAL at 14:52

## 2019-11-13 RX ADMIN — METOPROLOL TARTRATE 50 MG: 50 TABLET, FILM COATED ORAL at 08:51

## 2019-11-13 RX ADMIN — BUDESONIDE AND FORMOTEROL FUMARATE DIHYDRATE 2 PUFF: 80; 4.5 AEROSOL RESPIRATORY (INHALATION) at 00:46

## 2019-11-13 RX ADMIN — OXYCODONE HYDROCHLORIDE 10 MG: 5 TABLET ORAL at 23:06

## 2019-11-13 RX ADMIN — OXYCODONE HYDROCHLORIDE 10 MG: 5 TABLET ORAL at 07:03

## 2019-11-13 RX ADMIN — ACETAMINOPHEN 975 MG: 325 TABLET, FILM COATED ORAL at 21:03

## 2019-11-13 RX ADMIN — OXYCODONE HYDROCHLORIDE 10 MG: 5 TABLET ORAL at 03:57

## 2019-11-13 RX ADMIN — OXYCODONE HYDROCHLORIDE 10 MG: 5 TABLET ORAL at 00:56

## 2019-11-13 RX ADMIN — HYDROXYZINE HYDROCHLORIDE 25 MG: 25 TABLET, FILM COATED ORAL at 21:03

## 2019-11-13 RX ADMIN — ACETAMINOPHEN 975 MG: 325 TABLET, FILM COATED ORAL at 05:50

## 2019-11-13 RX ADMIN — BUDESONIDE AND FORMOTEROL FUMARATE DIHYDRATE 2 PUFF: 80; 4.5 AEROSOL RESPIRATORY (INHALATION) at 19:53

## 2019-11-13 RX ADMIN — HYDROXYZINE HYDROCHLORIDE 25 MG: 25 TABLET, FILM COATED ORAL at 00:14

## 2019-11-13 RX ADMIN — ACETAMINOPHEN 975 MG: 325 TABLET, FILM COATED ORAL at 13:46

## 2019-11-13 RX ADMIN — SODIUM CHLORIDE: 9 INJECTION, SOLUTION INTRAVENOUS at 00:59

## 2019-11-13 RX ADMIN — OXYCODONE HYDROCHLORIDE 10 MG: 5 TABLET ORAL at 13:46

## 2019-11-13 RX ADMIN — OXYCODONE HYDROCHLORIDE 10 MG: 5 TABLET ORAL at 20:00

## 2019-11-13 RX ADMIN — CEFAZOLIN SODIUM 2 G: 2 INJECTION, SOLUTION INTRAVENOUS at 05:50

## 2019-11-13 RX ADMIN — BUDESONIDE AND FORMOTEROL FUMARATE DIHYDRATE 2 PUFF: 80; 4.5 AEROSOL RESPIRATORY (INHALATION) at 08:52

## 2019-11-13 RX ADMIN — OXYCODONE HYDROCHLORIDE 10 MG: 5 TABLET ORAL at 16:45

## 2019-11-13 RX ADMIN — SENNOSIDES AND DOCUSATE SODIUM 2 TABLET: 8.6; 5 TABLET ORAL at 20:00

## 2019-11-13 RX ADMIN — OXYCODONE HYDROCHLORIDE 10 MG: 5 TABLET ORAL at 10:30

## 2019-11-13 RX ADMIN — HYDROXYZINE HYDROCHLORIDE 25 MG: 25 TABLET, FILM COATED ORAL at 08:51

## 2019-11-13 RX ADMIN — METOPROLOL TARTRATE 50 MG: 50 TABLET, FILM COATED ORAL at 19:57

## 2019-11-13 RX ADMIN — ENOXAPARIN SODIUM 40 MG: 40 INJECTION SUBCUTANEOUS at 05:50

## 2019-11-13 ASSESSMENT — ACTIVITIES OF DAILY LIVING (ADL)
ADLS_ACUITY_SCORE: 18
ADLS_ACUITY_SCORE: 17
ADLS_ACUITY_SCORE: 18
ADLS_ACUITY_SCORE: 17
ADLS_ACUITY_SCORE: 15
ADLS_ACUITY_SCORE: 18

## 2019-11-13 NOTE — PLAN OF CARE
Patient plan: anticipates going to a rehab facility per conversation  Current status: PT: order received; Initial evaluation completed and treatment initiated POD #1 s/p R TKA revision surgery; Patient has a stress fracture in L lower leg for which he uses a short CAM boot. On eval patient in bed; unable to SLR on R LE without mod/max A; use of KI for mobility; Min A and cues for supine to sit bed mobility; sit>stand with mod A x 1-2 and bed elevated to assist standing from edge of bed; only able to tolerate gait distance from bed to bedside wheelchair this am; use of KI on R and CAM boot on L; decreased L foot clearance during gait and patient requiring min A of 1-2 for safety; up in wheelchair at end of session with good tolerance; Able to tolerate TKA ex's with assist;Nurse updated on current level of assist  Anticipated status at discharge: Anticipate patient to be independent with bed mobility; CGA for sit<>stand and gait on level surfaces; Currently unknown whether patient will be able to tolerate 18 steps to access apt living environment; spouse unable to provide much physical assist secondary to own knee issues. Patient would need to be CGA for all mobility to allow for safe return home with spouse assist. Anticipate if patient is able to return directly home that he may benefit from Home PT initially as it would be difficult for patient to get in and out of apt building for therapy given stress fracture on L and recent knee revision surgery on R

## 2019-11-13 NOTE — PLAN OF CARE
Discharge Planner OT   Patient plan for discharge: See chart   Current status: IP OT orders received, chart reviewed, discussed with RN. Per chart and discussion, pt will discharge to TCU as early as Friday. All therapy needs are being met with IP PT.  Most appropriate to initiate OT in next level of care. Will defer OT to next level of care. Will cancel/complete IP OT orders/evaluation.  Barriers to return to prior living situation: Defer to PT   Recommendations for discharge: Defer to PT   Rationale for recommendations: Will defer OT to next level of care. Will cancel/complete IP OT orders/evaluation.       Entered by: Ronaldo Acosta 11/13/2019 3:27 PM

## 2019-11-13 NOTE — CONSULTS
Hospitalist Consultation      Douglas Jin MRN# [id number]   YOB: 1950 Age: 68 year old   Date of Admission: 11/12/2019     Requesting Physician:  Dr. Pierce  Reason for consult: Post-op medical management            Assessment and Plan:   This patient is a 68 year old male who is POD #1 s/p right TKA revision.  Overall doing well, no complaints. Pain controlled.    1. right TKA revision - pain, prophylaxis, diet and PT/OT per ortho. On Lovenox  2. HTN - resume home metoprolol and Maxide with parameters. BPs are stable  3. COPD - resume home inhalers, no wheezing on exam  4. Acute blood loss anemia - Pre-op Hgb 10.4, now 8.8. asymptomatic, continue to monitor               History of Present Illness:   This patient is a 68 year old male who is POD #1 s/p right TKA revision. He underwent R TKA about 5 yrs ago, but over time has had increasing R knee pain. XR showed concerns for component loosening and this was confirmed on a bone scan. He has failed conservative outpatient management so presents here for elective right TKA revision. He tolerated the surgery well, pain is well controlled, has had adequate I&Os, and he is tolerating PO intake. Ga is out and he voiding without difficulty. He is not passing gas or had a BM yet. He denies fever, chills, chest pain, SOB, cough, abdominal pain, nausea, vomiting, or diarrhea. He also has a PMHx of HTN and COPD.              Past Medical History:     Past Medical History:   Diagnosis Date     Arthritis      Chronic back pain      Constipation      COPD (chronic obstructive pulmonary disease) (H)      History of blood transfusion      Hypertension      Other chronic pain     low back     Pneumonia     recent bronchitis 10/2019               Past Surgical History:     Past Surgical History:   Procedure Laterality Date     ARTHROPLASTY KNEE Right 7/29/2015    Procedure: ARTHROPLASTY KNEE;  Surgeon: Eric Pierce MD;  Location: [facility] OR     FUSION  SPINE POSTERIOR MINIMALLY INVASIVE ONE LEVEL      L4-L5     HERNIA REPAIR, INGUINAL RT/LT Left      SINUS SURGERY  2011     TONSILLECTOMY      as a child                 Social History:     Social History     Tobacco Use     Smoking status: Former Smoker     Packs/day: 0.00     Types: Cigarettes     Last attempt to quit: 2019     Years since quittin.2     Smokeless tobacco: Never Used   Substance Use Topics     Alcohol use: Yes     Comment: recovering. Occasional drink     Drug use: No                 Family History:   I have reviewed this patient's family history  Mother - HTN            Allergies:     Allergies   Allergen Reactions     Flexeril [Cyclobenzaprine] Other (See Comments)     Numbness and tingling in fingers and muscle spasms in legs     Nsaids      Kidney problems     Naproxen Rash             Medications:     Prior to Admission medications    Medication Sig Last Dose Taking? Auth Provider   budesonide-formoterol (SYMBICORT) 80-4.5 MCG/ACT Inhaler Inhale 2 puffs into the lungs 2 times daily And may take additional 1-2 puffs per day as needed 2019 at Unknown time Yes Reported, Patient   clonazePAM (KLONOPIN) 1 MG tablet Take 1 mg by mouth 3 times daily as needed for anxiety 2019 at Unknown time Yes Reported, Patient   dextromethorphan-guaiFENesin (MUCINEX DM)  MG 12 hr tablet Take 1 tablet by mouth every 12 hours Past Week at Unknown time Yes Reported, Patient   HYDROcodone-acetaminophen (NORCO) 5-325 MG tablet Take 1 tablet by mouth every 6 hours as needed for severe pain Past Week at Unknown time Yes Reported, Patient   METOPROLOL TARTRATE PO Take 50 mg by mouth 2 times daily 2019 at Unknown time Yes Reported, Patient   triamterene-hydrochlorothiazide (MAXZIDE-25) 37.5-25 MG per tablet Take 1 tablet by mouth daily 2019 at Unknown time Yes Reported, Patient               Review of Systems:   A comprehensive greater than 10 system review of systems was carried out.  " Pertinent positives and negatives are noted above.  Otherwise negative for contributory info.            Physical Exam:   Vitals were reviewed  Blood pressure 115/57, pulse 72, temperature 98.6  F (37  C), resp. rate 16, height 1.753 m (5' 9\"), weight 126.6 kg (279 lb), SpO2 93 %.  Exam:    GENERAL:  Comfortable.  PSYCH: pleasant, oriented, No acute distress.  HEENT:  Normal conjunctiva, normal hearing, nasal mucosa and Oropharynx are normal.  NECK:  Supple, no neck vein distention.  HEART:  Normal S1, S2 with no murmur, no pericardial rub, S3 or S4.  LUNGS:  Clear to auscultation, normal Respiratory effort.  GI:  Soft, normal bowel sounds.  EXTREMITIES:  No pedal edema, +2 pulses bilateral and equal.  R knee dressing c/d/i  SKIN:  Dry to touch, No rash, wound or ulcerations.  NEUROLOGIC:  Nonfocal with normal cranial nerve and motor power and sensation.            Data:   Past 24 hours labs, studies, and imaging were reviewed.  Recent Labs   Lab 11/13/19  0615 11/12/19  1158   HGB 8.8* 10.4*   PLT  --  300     Recent Labs   Lab 11/12/19  1158   CR 1.18   GFRESTIMATED 63   GFRESTBLACK 73       Magali Drummond PA-C    Pt discussed with Dr. Kearns who agrees with the care as discussed above.   "

## 2019-11-13 NOTE — PLAN OF CARE
Arrived to floor from PACU around 2000. Transferred from cart to bed with hovermat and tolerated well.  VSS on room air.  Capnography reinitiated.  Orientated to room, staff, and call light.  Will continue to monitor.    Pt A/O x4.  VSS and afebrile.  Capnography in place, on room air.  Pain managed adequately with PRN PO oxycodone (5mg).  CMS intact - right foot slightly cooler to touch than left foot.  ACE dressing CDI.  Pt remained in bed this shift with frequent repositioning.  Hemovac in place - 30 ml of output this shift.  Ga catheter in place and patent.  Tolerating regular diet well.  Plan is home on discharge.  Will continue to monitor.

## 2019-11-13 NOTE — PROGRESS NOTES
11/13/19 0940   Quick Adds   Type of Visit Initial PT Evaluation   Living Environment   Lives With spouse   Living Arrangements apartment   Home Accessibility stairs to enter home   Number of Stairs, Main Entrance other (see comments)  (18)   Stair Railings, Main Entrance railings on both sides of stairs   Transportation Anticipated family or friend will provide   Living Environment Comment lives in an apt with 18 steps to enter   Self-Care   Usual Activity Tolerance good   Current Activity Tolerance moderate   Equipment Currently Used at Home crutches;cane, straight   Activity/Exercise/Self-Care Comment patient was having difficulty accessing apt prior to recent knee revision surgery   Functional Level Prior   Ambulation 1-->assistive equipment  (cane)   Transferring 1-->assistive equipment  (cane)   Toileting 0-->independent   Bathing 1-->assistive equipment   Cognition 0 - no cognition issues reported   Fall history within last six months no   Which of the above functional risks had a recent onset or change? ambulation;toileting;transferring;bathing;dressing   Prior Functional Level Comment patient reports recent difficulty with mobility since stress fracture on L   General Information   Onset of Illness/Injury or Date of Surgery - Date 11/12/19   Referring Physician Eric Pierce MD   Patient/Family Goals Statement go to rehab prior to return to apt   Pertinent History of Current Problem (include personal factors and/or comorbidities that impact the POC) per chart: POD #1 s/p right TKA revision; stress fracture on L lower leg; see medical record for further information   Precautions/Limitations fall precautions;other (see comments)  (KI)   General Observations patient in bed; spouse present in room for session   General Info Comments spouse has knee issues and will only be able to provide limited physical assist   Cognitive Status Examination   Orientation orientation to person, place and time    Level of Consciousness alert   Follows Commands and Answers Questions 100% of the time;75% of the time   Personal Safety and Judgment impaired;at risk behaviors demonstrated  (lets go of walker at times)   Memory intact   Pain Assessment   Patient Currently in Pain Yes, see Vital Sign flowsheet  (R knee)   Integumentary/Edema   Integumentary/Edema Comments R knee incision; drain just removed   Posture    Posture Comments WFL   Range of Motion (ROM)   ROM Comment R knee limited by recent surgery and pain; L ankle limited by stress fracture   Strength   Strength Comments R LE limited by recent surgery and pain; L leg limited by stress fracture   Bed Mobility   Bed Mobility Comments min A for R LE ; use of KI   Transfer Skills   Transfer Comments sit<>stand with mod A x 2 and bed elevated to assist standing   Gait   Gait Comments able to ambulate bed to chair with min A x 2; use of walker; difficulty clearing L LE;    Balance   Balance Comments impaired; current need for walker and assist   Sensory Examination   Sensory Perception Comments intact   Modality Interventions   Planned Modality Interventions Comments ice to R knee   General Therapy Interventions   Planned Therapy Interventions bed mobility training;gait training;ROM;strengthening;stretching;transfer training;progressive activity/exercise   Clinical Impression   Criteria for Skilled Therapeutic Intervention yes, treatment indicated   PT Diagnosis impaired gait/transfers   Influenced by the following impairments decreased R knee ROM/strength; pain; unable to SLR on R; L LE stress fracture   Functional limitations due to impairments impaired independence with functional mobiliyt   Clinical Presentation Evolving/Changing   Clinical Presentation Rationale clinical judgement   Clinical Decision Making (Complexity) Moderate complexity   Therapy Frequency 2x/day   Predicted Duration of Therapy Intervention (days/wks) 2-3 days   Anticipated Equipment Needs at  "Discharge front wheeled walker   Anticipated Discharge Disposition Other (see comments)  (defer to Ortho)   Risk & Benefits of therapy have been explained Yes   Patient, Family & other staff in agreement with plan of care Yes   [employer] TM \"6 Clicks\"   2016, Trustees of [facility], under license to [employer].  All rights reserved.   6 Clicks Short Forms Basic Mobility Inpatient Short Form   [facility]  \"6 Clicks\" V.2 Basic Mobility Inpatient Short Form   1. Turning from your back to your side while in a flat bed without using bedrails? 3 - A Little   2. Moving from lying on your back to sitting on the side of a flat bed without using bedrails? 3 - A Little   3. Moving to and from a bed to a chair (including a wheelchair)? 2 - A Lot   4. Standing up from a chair using your arms (e.g., wheelchair, or bedside chair)? 2 - A Lot   5. To walk in hospital room? 3 - A Little   6. Climbing 3-5 steps with a railing? 2 - A Lot   Basic Mobility Raw Score (Score out of 24.Lower scores equate to lower levels of function) 15   Total Evaluation Time   Total Evaluation Time (Minutes) 10     "

## 2019-11-13 NOTE — PLAN OF CARE
Pt up with 2 assist, walker, and gait belt to edge of bed. Pain partially controlled with oxycodone and atarax. LS clear with productive sputum after using the APSP, BS active, passing flatus. CMS intact with no numbness/tingling. Drsg to R knee is CDI with hemovac to suction intact. L elena/foot mild swelling and needs to wear CAM boot d/t a stress fx. Able to be WBAT on both. Ga in place with adequate output and will be dc'd this AM. Regular diet. Spuse at beside. Encouraged ankle pumps and CADB exercises.  Updte - Ga removed at 0545. Voided 30cc. Will continue to monitor.

## 2019-11-13 NOTE — PROGRESS NOTES
"Orthopedic Surgery  11/13/2019  POD#: 1    S: Patient voices no complaints today.     O: Blood pressure 115/57, pulse 72, temperature 98.6  F (37  C), resp. rate 16, height 1.753 m (5' 9\"), weight 126.6 kg (279 lb), SpO2 93 %.  Lab Results   Component Value Date    HGB 8.8 11/13/2019     No results found for: INR     I/O last 3 completed shifts:  In: 4229 [P.O.:700; I.V.:3529]  Out: 1945 [Urine:1600; Drains:295; Blood:50]    Neurovascularly intact.  Calves are negative bilaterally, both soft and nontender.  The wound is C/D/I.  The wound looks good with minimal erythema of the surrounding skin.    A: Mr. Jin is doing well status post Procedure(s):  Revision right total knee arthroplasty (choice anesthesia).    P: Hemovac pulled without difficulty.  1. Mobilize and continue physical therapy.   2. Anticoagulation - discharge on ASA  3. Pain management - controlled  4. Anticipate discharge to TCU, as per previous discussion with pt and need for CAM boot on left foot, likely Friday.      Fatimah Henson PA-C  O: [phone]  "

## 2019-11-13 NOTE — PLAN OF CARE
Alert, orientated. Post op day #1. Following plan of care. Vitals stable. Pleasant and talkative. Wife at the bedside. Dressing dry. Drain removed. Ga out and has voided. No edema. Unable to raise right leg off the bed per self. Strong dorsi and planter flexions. No numbness. Food and fluids well. Pain controlled. Able to rest between cares. Voiding. Up with 2 assist, walker, RODRICK, KI, Cam boot to left foot to the chair. Needing 2 assist- would need improvement with mobility to go home at discharge. Oxycodone, tylenol, vistaril for pain. Pulse ox on.   1348 improving mobility this afternoon- up with 1 assist, GB, cam boot, KI, walker. Oxycodone for pain. Able to rest between cares.

## 2019-11-14 ENCOUNTER — APPOINTMENT (OUTPATIENT)
Dept: PHYSICAL THERAPY | Facility: CLINIC | Age: 69
DRG: 467 | End: 2019-11-14
Attending: ORTHOPAEDIC SURGERY
Payer: MEDICARE

## 2019-11-14 LAB
ANION GAP SERPL CALCULATED.3IONS-SCNC: 5 MMOL/L (ref 3–14)
BUN SERPL-MCNC: 23 MG/DL (ref 7–30)
CALCIUM SERPL-MCNC: 8.1 MG/DL (ref 8.5–10.1)
CHLORIDE SERPL-SCNC: 108 MMOL/L (ref 94–109)
CO2 SERPL-SCNC: 25 MMOL/L (ref 20–32)
CREAT SERPL-MCNC: 1.28 MG/DL (ref 0.66–1.25)
GFR SERPL CREATININE-BSD FRML MDRD: 57 ML/MIN/{1.73_M2}
GLUCOSE SERPL-MCNC: 128 MG/DL (ref 70–99)
HGB BLD-MCNC: 7.5 G/DL (ref 13.3–17.7)
HGB BLD-MCNC: 7.9 G/DL (ref 13.3–17.7)
POTASSIUM SERPL-SCNC: 4 MMOL/L (ref 3.4–5.3)
SODIUM SERPL-SCNC: 138 MMOL/L (ref 133–144)

## 2019-11-14 PROCEDURE — 12000000 ZZH R&B MED SURG/OB

## 2019-11-14 PROCEDURE — 25000128 H RX IP 250 OP 636: Performed by: ORTHOPAEDIC SURGERY

## 2019-11-14 PROCEDURE — 25000132 ZZH RX MED GY IP 250 OP 250 PS 637: Mod: GY | Performed by: PHYSICIAN ASSISTANT

## 2019-11-14 PROCEDURE — 25000132 ZZH RX MED GY IP 250 OP 250 PS 637: Mod: GY | Performed by: ORTHOPAEDIC SURGERY

## 2019-11-14 PROCEDURE — 80048 BASIC METABOLIC PNL TOTAL CA: CPT | Performed by: ORTHOPAEDIC SURGERY

## 2019-11-14 PROCEDURE — 99232 SBSQ HOSP IP/OBS MODERATE 35: CPT | Performed by: INTERNAL MEDICINE

## 2019-11-14 PROCEDURE — 97530 THERAPEUTIC ACTIVITIES: CPT | Mod: GP | Performed by: PHYSICAL THERAPIST

## 2019-11-14 PROCEDURE — 85018 HEMOGLOBIN: CPT | Performed by: ORTHOPAEDIC SURGERY

## 2019-11-14 PROCEDURE — 36415 COLL VENOUS BLD VENIPUNCTURE: CPT | Performed by: ORTHOPAEDIC SURGERY

## 2019-11-14 PROCEDURE — 97116 GAIT TRAINING THERAPY: CPT | Mod: GP | Performed by: PHYSICAL THERAPIST

## 2019-11-14 RX ORDER — METHYLPREDNISOLONE SODIUM SUCCINATE 125 MG/2ML
125 INJECTION, POWDER, LYOPHILIZED, FOR SOLUTION INTRAMUSCULAR; INTRAVENOUS
Status: DISCONTINUED | OUTPATIENT
Start: 2019-11-14 | End: 2019-11-15 | Stop reason: HOSPADM

## 2019-11-14 RX ORDER — DIPHENHYDRAMINE HYDROCHLORIDE 50 MG/ML
50 INJECTION INTRAMUSCULAR; INTRAVENOUS
Status: DISCONTINUED | OUTPATIENT
Start: 2019-11-14 | End: 2019-11-15 | Stop reason: HOSPADM

## 2019-11-14 RX ORDER — HYDRALAZINE HYDROCHLORIDE 20 MG/ML
10 INJECTION INTRAMUSCULAR; INTRAVENOUS EVERY 4 HOURS PRN
Status: DISCONTINUED | OUTPATIENT
Start: 2019-11-14 | End: 2019-11-15 | Stop reason: HOSPADM

## 2019-11-14 RX ORDER — ALBUTEROL SULFATE 0.83 MG/ML
2.5 SOLUTION RESPIRATORY (INHALATION)
Status: DISCONTINUED | OUTPATIENT
Start: 2019-11-14 | End: 2019-11-15 | Stop reason: HOSPADM

## 2019-11-14 RX ADMIN — ACETAMINOPHEN 975 MG: 325 TABLET, FILM COATED ORAL at 22:02

## 2019-11-14 RX ADMIN — OXYCODONE HYDROCHLORIDE 10 MG: 5 TABLET ORAL at 05:10

## 2019-11-14 RX ADMIN — BUDESONIDE AND FORMOTEROL FUMARATE DIHYDRATE 2 PUFF: 80; 4.5 AEROSOL RESPIRATORY (INHALATION) at 20:06

## 2019-11-14 RX ADMIN — SENNOSIDES AND DOCUSATE SODIUM 2 TABLET: 8.6; 5 TABLET ORAL at 20:05

## 2019-11-14 RX ADMIN — HYDROXYZINE HYDROCHLORIDE 50 MG: 25 TABLET, FILM COATED ORAL at 22:02

## 2019-11-14 RX ADMIN — OXYCODONE HYDROCHLORIDE 10 MG: 5 TABLET ORAL at 02:14

## 2019-11-14 RX ADMIN — OXYCODONE HYDROCHLORIDE 10 MG: 5 TABLET ORAL at 09:03

## 2019-11-14 RX ADMIN — ENOXAPARIN SODIUM 40 MG: 40 INJECTION SUBCUTANEOUS at 06:05

## 2019-11-14 RX ADMIN — OXYCODONE HYDROCHLORIDE 10 MG: 5 TABLET ORAL at 23:17

## 2019-11-14 RX ADMIN — OXYCODONE HYDROCHLORIDE 10 MG: 5 TABLET ORAL at 20:05

## 2019-11-14 RX ADMIN — METOPROLOL TARTRATE 50 MG: 50 TABLET, FILM COATED ORAL at 09:03

## 2019-11-14 RX ADMIN — TRIAMTERENE AND HYDROCHLOROTHIAZIDE 1 TABLET: 37.5; 25 TABLET ORAL at 09:03

## 2019-11-14 RX ADMIN — HYDROXYZINE HYDROCHLORIDE 50 MG: 25 TABLET, FILM COATED ORAL at 10:06

## 2019-11-14 RX ADMIN — ACETAMINOPHEN 975 MG: 325 TABLET, FILM COATED ORAL at 05:10

## 2019-11-14 RX ADMIN — BUDESONIDE AND FORMOTEROL FUMARATE DIHYDRATE 2 PUFF: 80; 4.5 AEROSOL RESPIRATORY (INHALATION) at 13:30

## 2019-11-14 RX ADMIN — ACETAMINOPHEN 975 MG: 325 TABLET, FILM COATED ORAL at 13:29

## 2019-11-14 RX ADMIN — OXYCODONE HYDROCHLORIDE 10 MG: 5 TABLET ORAL at 16:36

## 2019-11-14 RX ADMIN — OXYCODONE HYDROCHLORIDE 10 MG: 5 TABLET ORAL at 13:28

## 2019-11-14 RX ADMIN — METOPROLOL TARTRATE 50 MG: 50 TABLET, FILM COATED ORAL at 20:05

## 2019-11-14 RX ADMIN — HYDROXYZINE HYDROCHLORIDE 25 MG: 25 TABLET, FILM COATED ORAL at 03:12

## 2019-11-14 ASSESSMENT — ACTIVITIES OF DAILY LIVING (ADL)
ADLS_ACUITY_SCORE: 17

## 2019-11-14 NOTE — PROGRESS NOTES
[facility]    Medicine Progress Note - Hospitalist Service       Date of Admission:  11/12/2019  Assessment & Plan   Mr. Jin is a 68-year-old male with a past medical history significant for osteoarthritis, hypertension, and COPD.  He underwent revision of previous right total knee arthroplasty on 11/12/2019.    1.  Right total knee arthroplasty revision.  Pain medications as needed.  Use incentive spirometry.  Work with therapy.    2.  Hypertension.  Continue metoprolol and triamterene/hydrochlorothiazide.  Have IV hydralazine available if needed.    3.  COPD.  No acute exacerbation.  Continue budesonide/formoterol.  Have albuterol available if needed.    4.  Anemia.  Postop.  Hemoglobin 7.5 today.  Give IV Venofer 300 mg once today.    5.  Chronic kidney disease.  Creatinine appears to be at baseline.  Avoid nephrotoxins as able.    Diet: Advance Diet as Tolerated: Regular Diet Adult  Advance Diet as Tolerated    DVT Prophylaxis: Enoxaparin (Lovenox) SQ  Ga Catheter: not present  Code Status: Full Code        Frank Kearns DO  Hospitalist Service  [facility]    ______________________________________________________________________    Interval History   Having right knee pain.  Denies chest pain, shortness of breath, fevers, chills, nausea, vomiting, or diarrhea.    Data reviewed today: I reviewed all medications, new labs and imaging results over the last 24 hours.    Physical Exam   Vital Signs: Temp: 99.7  F (37.6  C) Temp src: Oral BP: (!) 150/68 Pulse: 80 Heart Rate: 100 Resp: 16 SpO2: 90 % O2 Device: None (Room air)    Weight: 279 lbs 0 oz  Gen:  NAD, A&Ox3.  Eyes:  PERRL, sclera anicteric.  OP:  MMM, no lesions.  Neck:  Supple.  CV:  Regular, no murmurs.  Lung:  CTA b/l, normal effort.  Ab:  +BS, soft.  Skin:  Warm, dry to touch.  No rash.  Ext:  No pitting edema LE b/l.      Data   Recent Labs   Lab 11/14/19  0558 11/13/19  0615 11/12/19  1158   HGB 7.5* 8.8* 10.4*    PLT  --   --  300     --   --    POTASSIUM 4.0  --   --    CHLORIDE 108  --   --    CO2 25  --   --    BUN 23  --   --    CR 1.28*  --  1.18   ANIONGAP 5  --   --    DIANA 8.1*  --   --    *  --   --

## 2019-11-14 NOTE — PLAN OF CARE
Patient plan: anticipates going to a rehab facility per conversation  Current status: Pt supine upon arrival, agreeable to PT. Extra time needed to don KI, CAM boot, and shoe on R foot. Pt transferred supine<>sit with min A for R LE. Denied lightheadedness upon position change. Transferred sit<>stand with CGA and FWW. VC given for form and technique. Ambulated 100' with FWW, CGA, KI, and CAM boot within room. Ambulated at decreased gait speed with heavy reliance on UE for support. Pt denied dizziness/lightheadedness throughout and no LOB noted.Transferred stand<>sit with CGA, VC for form and technique, demonstrating poor eccentric control. Transferred sit<>supine with min A x 1 for LE. Supine end of session with all needs in reach.  Anticipated status at discharge: Anticipate patient to be SBA for bed mobility, transfers, ambulation; Currently unknown whether patient will be able to tolerate 18 steps; spouse unable to provide much physical assist secondary to own knee issues.

## 2019-11-14 NOTE — PLAN OF CARE
Up with assist of one using K.I. Pain managed with PRN oxy 10, Atarax and ice. Aquacel has scant drainage.Tolerating a regular diet. Voiding appropriately and passing gas. Plans to discharge to TCU.

## 2019-11-14 NOTE — PLAN OF CARE
Pt A&O x4. VS stable; afebrile. PO oxycodone, vistaril, and scheduled tylenol managing pain. CMS intact. Dressing CDI-ace wrap on; incision iced. Up w/ A1, using gait belt, walker, KI to RLE and cam boot to LLE. Voiding in good amts. Tolerating regular diet. Plan is TCU @ discharge. Will continue to monitor.

## 2019-11-14 NOTE — CONSULTS
.Care Transition Initial Assessment - SW  Discharge planning; Noted per ortho PA the anticipation of pt's discharge to TCU, likely Friday. SW has noted pt residence in [facility].   Met with: Patient and Family    Active Problems:    S/P total knee arthroplasty       DATA  Lives With: spouse   Living Arrangements: apartment  Quality of Family Relationships: involved, supportive  Description of Support System: Involved, Supportive  Who is your support system?: Wife     Resources List: Skilled Nursing Facility     Quality of Family Relationships: involved, supportive  Transportation Anticipated: (to be determined)    INTERVENTION    Met with pt and wife, provided SNF listing from Medicare website which included quality measures information regarding facilities noted. Based on their review of information provided they have asked that referrals be made to [facility], and [facility] which are both in [facility]. Private room requested however semi-private acceptable if there would be a private room fee that they would need to pay. Additional questions were addressed.     ASSESSMENT  Cognitive Status:  alert and oriented     PLAN    Patient given options and choices for discharge: Yes  Patient/family is agreeable to the plan?  Yes  Patient Goals and Preferences: independence  Patient anticipates discharging to:  rehab facility    Will await facility determination, continue planning for transfer to rehab facility in anticipation of discharge tomorrow.         BISHNU Garces   Care Transitions Services   [facility]     [phone]

## 2019-11-14 NOTE — PROGRESS NOTES
"Orthopedic Surgery  11/14/2019    S: Patient voices no complaints today.     O: Blood pressure (!) 145/66, pulse 80, temperature 98.9  F (37.2  C), temperature source Oral, resp. rate 16, height 1.753 m (5' 9\"), weight 126.6 kg (279 lb), SpO2 90 %.  Lab Results   Component Value Date    HGB 8.8 11/13/2019     No results found for: INR     Neurovascularly intact.  Calves are negative bilaterally, both soft and nontender.  The wound is C/D/I.  The wound looks good with minimal erythema of the surrounding skin.    A: Mr. Jin is doing well status post Procedure(s):  Right total knee arthroplasty revision using a Casi LCCK implant.    P: Continue physical therapy.   Anticoagulation   Pain management  Discharge planning, TCU at discharge    Eric Pierce MD  [phone]    "

## 2019-11-14 NOTE — PLAN OF CARE
Patient plan: anticipates going to a rehab facility per conversation  Current status: Supine upon initiation, agreeable to PT. Extra time needed to don KI and CAM boot. Transferred supine<>sit with min A for R LE. Transferred sit<>stand with CGA, FWW, CAM boot, KI, and R shoe. Ambulated 225' in hallway with FWW, KI, CAM boot, and CGA. Pt ambulates at decreased gait speed with decreased step clearance and heavy reliance on UE for support. Pt had a few missteps while ambulating over thresholds but no LOB observed. Pt reports moderate pain throughout, denies dizziness or lightheadedness. Transferred stand<>sit with SBA and good eccentric control, VC for form and technique. Transferred sit<>supine with min A for LE. Supine end of session with all needs in reach.  Anticipated status at discharge: Anticipate patient to be SBA for bed mobility, transfers, ambulation; Currently unknown whether patient will be able to tolerate 18 steps; spouse unable to provide much physical assist secondary to own knee issues.

## 2019-11-14 NOTE — PLAN OF CARE
A&O x 4. Up with A x 1, gait belt, walker, KI, and cam boot on L foot. Oxycodone, atarax, tylenol, and ice for pain. IV SL. Dressing with scant amount of drainage. Passing flatus, voiding without difficulty

## 2019-11-15 ENCOUNTER — APPOINTMENT (OUTPATIENT)
Dept: PHYSICAL THERAPY | Facility: CLINIC | Age: 69
DRG: 467 | End: 2019-11-15
Attending: ORTHOPAEDIC SURGERY
Payer: MEDICARE

## 2019-11-15 VITALS
SYSTOLIC BLOOD PRESSURE: 168 MMHG | DIASTOLIC BLOOD PRESSURE: 70 MMHG | HEIGHT: 69 IN | HEART RATE: 96 BPM | OXYGEN SATURATION: 94 % | TEMPERATURE: 99.6 F | BODY MASS INDEX: 41.32 KG/M2 | WEIGHT: 279 LBS | RESPIRATION RATE: 16 BRPM

## 2019-11-15 LAB — PLATELET # BLD AUTO: 194 10E9/L (ref 150–450)

## 2019-11-15 PROCEDURE — 99232 SBSQ HOSP IP/OBS MODERATE 35: CPT | Performed by: INTERNAL MEDICINE

## 2019-11-15 PROCEDURE — 25000132 ZZH RX MED GY IP 250 OP 250 PS 637: Mod: GY | Performed by: ORTHOPAEDIC SURGERY

## 2019-11-15 PROCEDURE — 25000128 H RX IP 250 OP 636: Performed by: ORTHOPAEDIC SURGERY

## 2019-11-15 PROCEDURE — 97116 GAIT TRAINING THERAPY: CPT | Mod: GP | Performed by: PHYSICAL THERAPIST

## 2019-11-15 PROCEDURE — 25000132 ZZH RX MED GY IP 250 OP 250 PS 637: Mod: GY | Performed by: PHYSICIAN ASSISTANT

## 2019-11-15 PROCEDURE — 36415 COLL VENOUS BLD VENIPUNCTURE: CPT | Performed by: ORTHOPAEDIC SURGERY

## 2019-11-15 PROCEDURE — 85049 AUTOMATED PLATELET COUNT: CPT | Performed by: ORTHOPAEDIC SURGERY

## 2019-11-15 PROCEDURE — 97530 THERAPEUTIC ACTIVITIES: CPT | Mod: GP | Performed by: PHYSICAL THERAPIST

## 2019-11-15 RX ORDER — CLONAZEPAM 1 MG/1
1 TABLET ORAL 3 TIMES DAILY PRN
Qty: 60 TABLET | Refills: 0 | Status: SHIPPED | OUTPATIENT
Start: 2019-11-15

## 2019-11-15 RX ADMIN — HYDROXYZINE HYDROCHLORIDE 25 MG: 25 TABLET, FILM COATED ORAL at 09:01

## 2019-11-15 RX ADMIN — ACETAMINOPHEN 975 MG: 325 TABLET, FILM COATED ORAL at 05:45

## 2019-11-15 RX ADMIN — OXYCODONE HYDROCHLORIDE 10 MG: 5 TABLET ORAL at 08:58

## 2019-11-15 RX ADMIN — ENOXAPARIN SODIUM 40 MG: 40 INJECTION SUBCUTANEOUS at 05:45

## 2019-11-15 RX ADMIN — SENNOSIDES AND DOCUSATE SODIUM 2 TABLET: 8.6; 5 TABLET ORAL at 08:54

## 2019-11-15 RX ADMIN — OXYCODONE HYDROCHLORIDE 10 MG: 5 TABLET ORAL at 02:40

## 2019-11-15 RX ADMIN — HYDROXYZINE HYDROCHLORIDE 25 MG: 25 TABLET, FILM COATED ORAL at 08:59

## 2019-11-15 RX ADMIN — METOPROLOL TARTRATE 50 MG: 50 TABLET, FILM COATED ORAL at 08:54

## 2019-11-15 RX ADMIN — BUDESONIDE AND FORMOTEROL FUMARATE DIHYDRATE 2 PUFF: 80; 4.5 AEROSOL RESPIRATORY (INHALATION) at 10:46

## 2019-11-15 RX ADMIN — TRIAMTERENE AND HYDROCHLOROTHIAZIDE 1 TABLET: 37.5; 25 TABLET ORAL at 08:54

## 2019-11-15 RX ADMIN — OXYCODONE HYDROCHLORIDE 10 MG: 5 TABLET ORAL at 12:17

## 2019-11-15 RX ADMIN — OXYCODONE HYDROCHLORIDE 10 MG: 5 TABLET ORAL at 05:45

## 2019-11-15 RX ADMIN — CLONAZEPAM 1 MG: 1 TABLET ORAL at 01:01

## 2019-11-15 ASSESSMENT — ACTIVITIES OF DAILY LIVING (ADL)
ADLS_ACUITY_SCORE: 18
ADLS_ACUITY_SCORE: 17

## 2019-11-15 NOTE — PLAN OF CARE
Up with assist of one using K.I. and cam boot. Pain managed with PRN oxy 10 and ice. Voiding appropriately. Required 2L O2 for sleep. Aquacel has scant drainage. Plans to discharge to TCU today.

## 2019-11-15 NOTE — PLAN OF CARE
Pt A&O x4. VS stable; low-grade temp of 100.1; encouraged use of IS. PO oxycodone, vistaril, and scheduled tylenol managing pain. CMS intact. Dressing CDI-incision iced. Up w/ A1, using gait belt, walker, and KI-cam boot applied to LLE when OOB. Voiding in good amts. Tolerating regular diet. Plan is to discharge to TCU tomorrow. Will continue to monitor.

## 2019-11-15 NOTE — PROGRESS NOTES
"Orthopedic Surgery  11/15/2019  POD#: 3    S: Patient voices no complaints today. Pain managed well, and denies calf pain, dizziness, SOB.    O: Blood pressure 131/65, pulse 96, temperature 99.9  F (37.7  C), temperature source Oral, resp. rate 16, height 1.753 m (5' 9\"), weight 126.6 kg (279 lb), SpO2 96 %.  Lab Results   Component Value Date    HGB 7.9 11/14/2019     No results found for: INR     I/O last 3 completed shifts:  In: 714 [P.O.:714]  Out: -     Neurovascularly intact.  Calves are negative bilaterally, both soft and nontender.  The wound is C/D/I.  The wound looks good with minimal erythema of the surrounding skin.    A: Mr. Jin is doing well status post Procedure(s):  Right total knee arthroplasty revision using a Casi LCCK implant.    P:   1. Mobilize and continue physical therapy.   2. Anticoagulation - discharge on ASA  3. Pain management - controlled  4. Anticipate discharge to TCU today if placement found.      Fatimah Henson PA-C  O: [phone]    "

## 2019-11-15 NOTE — PLAN OF CARE
Patient plan: TCU  Current status: Supine upon initiation, agreeable to PT. Transferred supine<>sit with VC and min A for R LE with KI on R LE, CAM boot on L foot. Transferred sit<>stand with CGA and FWW. Ambulated 225' in hallway with FWW, KI, CAM boot, shoe on R foot, and CGA. VC given for form and technique. Ambulates at decreased gait speed and decreased step length/clearance. Pt reports more pain in L foot today. Denies dizziness/lightheadedness throughout. No LOB throughout. Transferred stand<>sit with CGA and good eccentric control and sit<>supine with min A for R LE. Supine end of session with all needs in reach.  Anticipated status at discharge: Anticipate patient to be min A for bed mobility, SBA for transfers, ambulation; Currently unknown whether patient will be able to tolerate 18 steps; spouse unable to provide much physical assist secondary to own knee issues.

## 2019-11-15 NOTE — PROGRESS NOTES
[facility]    Medicine Progress Note - Hospitalist Service       Date of Admission:  11/12/2019  Assessment & Plan   Mr. Jin is a 68-year-old male with a past medical history significant for osteoarthritis, hypertension, and COPD.  He underwent revision of previous right total knee arthroplasty on 11/12/2019.    1.  Right total knee arthroplasty revision.  As per ortho.      2.  Hypertension.  Continue metoprolol and triamterene/hydrochlorothiazide.  Have IV hydralazine available if needed.    3.  COPD.  No acute exacerbation.  Continue budesonide/formoterol.  Have albuterol available if needed.  Now off oxygen and sats ok.    4.  Thalassemia with acute surgical blood loss Anemia.  Postop.  Hemoglobin 7.9 today.  Give IV Venofer 300 mg once 11/14.  Will start iron supplement at discharge for 30 days and recommend repeat hemoglobin within next month.    5.  Chronic kidney disease stage 3.  Creatinine appears to be at baseline.  Avoid nephrotoxins as able.    Diet: Advance Diet as Tolerated: Regular Diet Adult  Advance Diet as Tolerated    DVT Prophylaxis: Enoxaparin (Lovenox) SQ  Ga Catheter: not present  Code Status: Full Code        Ehsan Hopkins MD  Hospitalist Service  [facility]    ______________________________________________________________________    Interval History   Having right knee pain.  Denies chest pain, shortness of breath, fevers, chills, nausea, vomiting, or diarrhea.   Has history of thalassemia and hemoglobin typically around 11 at baseline.     Data reviewed today: I reviewed all medications, new labs and imaging results over the last 24 hours.    Physical Exam   Vital Signs: Temp: 99.9  F (37.7  C) Temp src: Oral BP: (!) 158/91   Heart Rate: 96 Resp: 16 SpO2: 93 % O2 Device: Nasal cannula Oxygen Delivery: 2 LPM  Weight: 279 lbs 0 oz  Gen:  NAD, A&Ox3.  Eyes:  sclera anicteric.  OP:  MMM, no lesions.  Neck:  Supple.  CV:  Regular, no murmurs.  Lung:  CTA  b/l, normal effort.  Ext:  No pitting edema LE b/l.      Data   Recent Labs   Lab 11/15/19  0659 11/14/19  1722 11/14/19  0558 11/13/19  0615 11/12/19  1158   HGB  --  7.9* 7.5* 8.8* 10.4*     --   --   --  300   NA  --   --  138  --   --    POTASSIUM  --   --  4.0  --   --    CHLORIDE  --   --  108  --   --    CO2  --   --  25  --   --    BUN  --   --  23  --   --    CR  --   --  1.28*  --  1.18   ANIONGAP  --   --  5  --   --    DIANA  --   --  8.1*  --   --    GLC  --   --  128*  --   --

## 2019-11-15 NOTE — PROGRESS NOTES
Reviewed discharge instructions and medications with patient and wife, Carlyn. Questions answered. Patient discharged to TCU via HE w/c, discharge instructions, and belongings at this time.

## 2019-11-15 NOTE — PLAN OF CARE
Physical Therapy Discharge Summary    Reason for therapy discharge:    Discharged to transitional care facility.    Progress towards therapy goal(s). See goals on Care Plan in [facility] electronic health record for goal details.  Goals partially met.  Barriers to achieving goals:   discharge from facility and unable to complete stairs at this time d/t weakness and pain.    Therapy recommendation(s):    Continued therapy is recommended.  Rationale/Recommendations:  Continued PT in TCU to increase ind with functional mobility.

## 2019-11-15 NOTE — DISCHARGE INSTRUCTIONS
Return to clinic in 10-14 days.  Call [phone] to schedule or if you experience any problems before your scheduled appointment.    See general discharge instruction sheet for knees  1. Do exercises at home as instructed by therapist twice a day. Continue outpatient therapy as ordered by your doctor.  2. Ice knee after exercises and therapy.  3. Examine dressing daily for problems.  4. May shower, can get dressing wet, no soaking (no bathtubs, pools or hot tubs)  5. Notify your dentist or physician of your implant so you can get antibiotics before any dental work or before any invasive procedure (ie: colonoscopy)    Aquacel dressing:  DO NOT CHANGE DRESSING DAILY.  Leave this dressing on until follow-up appointment with Orthopedic Surgeon.  Dressing is waterproof, can shower with it on, pat dry when done.  No soaking such as in tub baths, pools or hot tubs    While on narcotic pain medication, to prevent constipation:  1. Drink plenty of water to keep well hydrated   2. May take over the counter Colace or Senna (follow instructions on label)      Call your physician if: ([phone])   1. Persistent fever greater than 100 degrees with body chills or excessive sweating.  2. Increased/persistent redness, localized warmth, tenderness, drainage or swelling at incision site. Greater than 50% drainage on dressing.   3. Persistent pain not controlled with oral pain medications, ice and rest.   4. No bowel movement in 3 days (may use Milk of Magnesia or other over the counter remedy).  5. Chest pain, shortness of breath, and/or calf pain with excessive swelling.  6. Generalized feeling of illness.  7. Any other questions or concerns related to your surgery/recovery.    Thank you for allowing [facility] to participate in your cares!!

## 2019-11-15 NOTE — PROGRESS NOTES
SWS     D: Discharge planning continuing for pt's transfer to rehab facility today. Assessment completed by [facility] in [place] and they are able to accept pt, private room with not additional cost to pt. No return contact from [facility]'s from yesterday referral.       I: Met with pt and wife and discussed above, they have asked that planning continue for pt's transfer to [facility], with medical transport requested. [facility] w/c has been arranged for 1300, with billing to [place] as Medicare does not cover the w/c transport. [facility] has requested that pt sign an Advanced Recipient Notice of Non-Covered Item with the document noting:       Non-covered service/item - description: out of town mileage    Reason: out of town mileage not covered    Alternate.. covered service/item: transfer to closest facility    Estimated cost of non-covered service: [employer] rig $74.90 base rate ( should be covered), mileage @ $4.82/mi X 89 miles = $428.98, pt responsible.     Terms of payment Can do payment arrangement      The document was presented to pt and wife, pt reviewed the document and sign, copy sent back to Whit Courtney @ [facility], pt provided with copy.     A/P: Anticipate no problem wit arrangements made for transfer today, with discharge no further SWS.       .    BISHNU Garces   Care Transitions Services   [facility]     [phone]

## 2019-11-15 NOTE — PROGRESS NOTES
Your information has been submitted on November 15th, 2019 at 12:00:10 PM CST. The confirmation number is [id number]

## 2019-11-17 LAB
BACTERIA SPEC CULT: NO GROWTH
SPECIMEN SOURCE: NORMAL

## 2019-11-18 NOTE — OP NOTE
Procedure Date: 11/12/2019      PREOPERATIVE DIAGNOSIS:  Failed loose right total knee arthroplasty.      POSTOPERATIVE DIAGNOSIS:  Failed loose right total knee arthroplasty.      PROCEDURE:  Right total knee arthroplasty revision using a Casi LCCK implant.      SURGEON:  Eric Pierce MD.      FIRST ASSISTANT:  Fatimah Henson PA-C.      INDICATIONS FOR SURGERY:  Mr. Jin is a very pleasant 68-year-old male who had a right total knee arthroplasty done about 5 years ago.  He did well postoperatively, but over time has had increasing pain in his right tibia.  X-ray showed some lucencies around his right tibial implant as well as a bone scan that was positive for our tibial loosening.  We discussed treatment options.  Given his age, activity level and nature of this, I think he would benefit from revision of his right total knee arthroplasty.  Inflammatory markers were negative for acute inflammation and we discussed revision total knee arthroplasty.  He understands the risks, benefits and alternatives and wishes to proceed with surgery.      NARRATIVE EVENTS:  After thorough preoperative evaluation and proper identification of patient's extremity to be operated on, Mr. Jin was taken to the operating room where he underwent a general anesthetic, placed supine on the operating table and his right leg was prepped and draped in the usual sterile manner.  After appropriate surgical pause to confirm the patient's extremity to be operated on, 30 minutes after the patient received 2 grams of Ancef, his right leg was exsanguinated and tourniquet was raised to 300 mmHg on his right upper thigh.  We approached his right knee through the previous midline incision, ellipsing out his previous scar.  The skin and soft tissue were sharply dissected down to the patella where a medium parapatellar arthrotomy was performed.  We everted the patella, performed an extensive synovectomy.  Fluid was sent for culture as well as 2  tissue samples were sent for culture and a tissue sample was sent for frozen section analysis for acute inflammation.  This showed no evidence of acute inflammation.  We proceeded on with our total knee arthroplasty.  At this point, it was evident that his tibial implant was grossly loose.  His femoral implant was reasonably well fixated.  The patella was very well fixed.  The decision was made at this point, given that his patella was in reasonable position, to remove his femoral component which we did with minimal difficulty and then removed the tibial component which came off quite easily from his tibia and femur respectively.  We then paid our attention to the tibia, reaming the tibia up.  After removing all the excess cement from the knee, we reamed the tibia shaft up to fit a size 17 mm in diameter x 100 mm in length tibial stem.  Once this was done, we used the stem to make our perpendicular proximal tibial cut using an intramedullary guide.  Once this was done, we sized the proximal tibia.  It sized to fit a size 8 tibia with a 17 mm in diameter x 100 mm in length stem.  This gave us good alignment and bone.  We reamed and punched for the tibial keel and placed our trial implant into position, a size 8 femur with a 17 mm x 100 mm tibial stem.  This was solidly into position.  We paid our attention then to the femur here.  We opened the intramedullary canal and then sequentially reamed up to fit a size 18 x 100 mm femoral stem.  We sized the distal femur.  It sized to fit a size G femoral component and we made our distal femoral resection, resecting about 3 mm of distal femoral bone.  We made our box cut for our LCCK femoral component and placed our trial implant into position, a size G femur with an 18 x 100 mm femoral stem.  With a 10 mm thick polyethylene insert, this gave us good stability and full range of motion.  So at this point, we then thoroughly irrigated the knee and prepared to cement in our  final components.  Using 2 batches of Simplex cement with tobramycin, one in the femur and one in the tibia, we cemented primarily metaphyseal, starting with the tibia, a size 8 tibia with a 17 x 100 mm tibial stem and then with a second batch of cement, a size G femur with an 18 x 100 mm straight femoral stem.  Once the implants were in position, we removed all the excess cement from the knee.  We retrialed our polyethylene inserts, found that a 10 mm insert gave the best stability and full range of motion, so a 10 mm LCCK implant was then impacted and locked into position with the set screw.  The knee was reduced after thorough irrigation with both saline and IrriSept solution.  We then closed the arthrotomy with #1 and 0 Vicryl sutures.  We placed one drain deep to the arthrotomy, closed skin and soft tissue with absorbable sutures and staples in the skin.  The patient was placed in a well-padded postoperative dressing and taken to the recovery room in stable condition.  He tolerated the procedure without difficulty.      Revised Surgeon Name:   2019, sp, Text copied from signed note in Epic               DEANDRE TIERNEY MD             D: 2019   T: 2019   MT: FOSTER      Name:     PORTIA LOPEZ   MRN:      [id number]        Account:        [id number]   :      1950           Procedure Date: 2019      Document: [id number]

## 2019-11-19 LAB
BACTERIA SPEC CULT: NORMAL
BACTERIA SPEC CULT: NORMAL
Lab: NORMAL
Lab: NORMAL
SPECIMEN SOURCE: NORMAL
SPECIMEN SOURCE: NORMAL

## 2019-11-20 NOTE — DISCHARGE SUMMARY
"Discharge Summary    Douglas Jin MRN# [id number]   YOB: 1950 Age: 68 year old     Date of Admission: 11/12/2019    Date of Discharge: 11/15/2019    Reason for Admission: Douglas Jin is an 68 year old male who was admitted to the hospital following surgery with Dr. Eric Pierce.    Preoperative Diagnosis: failed right total knee    Postoperative Diagnosis: failed right total knee    Procedure Completed: right revision total knee arthroplasty     Hospital Course:  Mr. Jin was admitted and underwent the above procedure. The patient tolerated the procedure well. There were no complications. Following surgery he was admitted to the floor.  During his stay he did not require any blood transfusions.  His last hemoglobin was noted to be   Lab Results   Component Value Date    HGB 7.9 11/14/2019   .  His pain was controlled with oral pain medication.  Through his progression in physical therapy, it was determined that he would best be served with a stay at a transitional care unit.  Social work arranged for this.    Discharge Physical Exam:  BP (!) 168/70 (BP Location: Right arm)   Pulse 96   Temp 99.6  F (37.6  C) (Oral)   Resp 16   Ht 1.753 m (5' 9\")   Wt 126.6 kg (279 lb)   SpO2 94%   BMI 41.20 kg/m    Neurovascularly intact, distal pulses present bilaterally.  Calves are negative bilaterally, both soft and nontender.  The wound looks good with minimal erythema of the surrounding skin.    Assessment: Mr. Jin is stable and doing well status post right revision total knee arthroplasty on POD #3.    Plan: We will discharge him to a transitional care unit on analgesics and deep venous thrombosis prophylaxis.  He will follow-up with Fatimah Henson PA-C or Dr. Eric Pierce approximately 2 weeks from surgery.  He may call [phone] to schedule an appointment.    Meds:   Douglas Jin   Home Medication Instructions NEVAEH:[id number]    Printed on:11/20/19 5118   Medication Information       "                aspirin (ASA) 325 MG EC tablet  Take one Aspirin tab twice daily for 5 weeks.             budesonide-formoterol (SYMBICORT) 80-4.5 MCG/ACT Inhaler  Inhale 2 puffs into the lungs 2 times daily And may take additional 1-2 puffs per day as needed             clonazePAM (KLONOPIN) 1 MG tablet  Take 1 tablet (1 mg) by mouth 3 times daily as needed for anxiety             dextromethorphan-guaiFENesin (MUCINEX DM)  MG 12 hr tablet  Take 1 tablet by mouth every 12 hours             ferrous sulfate (SLO-FE) 142 (45 Fe) MG CR tablet  Take 1 tablet (142 mg) by mouth daily             hydrOXYzine (ATARAX) 25 MG tablet  Take 1-2 tablets (25-50 mg) by mouth every 6 hours as needed for other (muscle spasms)             METOPROLOL TARTRATE PO  Take 50 mg by mouth 2 times daily             oxyCODONE-acetaminophen (PERCOCET) 5-325 MG tablet  Take 1-2 tablets by mouth every 4 hours as needed for severe pain             senna-docusate (SENOKOT-S/PERICOLACE) 8.6-50 MG tablet  Take 1 tablet by mouth 2 times daily             triamterene-hydrochlorothiazide (MAXZIDE-25) 37.5-25 MG per tablet  Take 1 tablet by mouth daily                     Fatimah Henson PA-C  O: [phone]

## 2019-11-21 ENCOUNTER — ANESTHESIA EVENT (OUTPATIENT)
Dept: SURGERY | Facility: CLINIC | Age: 69
End: 2019-11-21
Payer: MEDICARE

## 2019-11-21 ENCOUNTER — ANESTHESIA (OUTPATIENT)
Dept: SURGERY | Facility: CLINIC | Age: 69
End: 2019-11-21
Payer: MEDICARE

## 2019-11-21 ENCOUNTER — HOSPITAL ENCOUNTER (OUTPATIENT)
Facility: CLINIC | Age: 69
Discharge: HOME | End: 2019-11-22
Attending: ORTHOPAEDIC SURGERY | Admitting: ORTHOPAEDIC SURGERY
Payer: MEDICARE

## 2019-11-21 DIAGNOSIS — Z96.651 STATUS POST TOTAL RIGHT KNEE REPLACEMENT: Primary | ICD-10-CM

## 2019-11-21 PROCEDURE — 25800030 ZZH RX IP 258 OP 636: Performed by: ANESTHESIOLOGY

## 2019-11-21 PROCEDURE — 25000125 ZZHC RX 250: Performed by: ANESTHESIOLOGY

## 2019-11-21 PROCEDURE — 87075 CULTR BACTERIA EXCEPT BLOOD: CPT | Performed by: ORTHOPAEDIC SURGERY

## 2019-11-21 PROCEDURE — 25000128 H RX IP 250 OP 636: Performed by: ANESTHESIOLOGY

## 2019-11-21 PROCEDURE — 25000128 H RX IP 250 OP 636: Performed by: NURSE ANESTHETIST, CERTIFIED REGISTERED

## 2019-11-21 PROCEDURE — 37000009 ZZH ANESTHESIA TECHNICAL FEE, EACH ADDTL 15 MIN: Performed by: ORTHOPAEDIC SURGERY

## 2019-11-21 PROCEDURE — 25000128 H RX IP 250 OP 636: Performed by: ORTHOPAEDIC SURGERY

## 2019-11-21 PROCEDURE — 40000171 ZZH STATISTIC PRE-PROCEDURE ASSESSMENT III: Performed by: ORTHOPAEDIC SURGERY

## 2019-11-21 PROCEDURE — 71000013 ZZH RECOVERY PHASE 1 LEVEL 1 EA ADDTL HR: Performed by: ORTHOPAEDIC SURGERY

## 2019-11-21 PROCEDURE — 87070 CULTURE OTHR SPECIMN AEROBIC: CPT | Performed by: ORTHOPAEDIC SURGERY

## 2019-11-21 PROCEDURE — 25800025 ZZH RX 258: Performed by: ORTHOPAEDIC SURGERY

## 2019-11-21 PROCEDURE — 71000012 ZZH RECOVERY PHASE 1 LEVEL 1 FIRST HR: Performed by: ORTHOPAEDIC SURGERY

## 2019-11-21 PROCEDURE — 87186 SC STD MICRODIL/AGAR DIL: CPT | Performed by: ORTHOPAEDIC SURGERY

## 2019-11-21 PROCEDURE — 37000008 ZZH ANESTHESIA TECHNICAL FEE, 1ST 30 MIN: Performed by: ORTHOPAEDIC SURGERY

## 2019-11-21 PROCEDURE — 36000050 ZZH SURGERY LEVEL 2 1ST 30 MIN: Performed by: ORTHOPAEDIC SURGERY

## 2019-11-21 PROCEDURE — 25000125 ZZHC RX 250: Performed by: NURSE ANESTHETIST, CERTIFIED REGISTERED

## 2019-11-21 PROCEDURE — 87077 CULTURE AEROBIC IDENTIFY: CPT | Performed by: ORTHOPAEDIC SURGERY

## 2019-11-21 PROCEDURE — 27210794 ZZH OR GENERAL SUPPLY STERILE: Performed by: ORTHOPAEDIC SURGERY

## 2019-11-21 PROCEDURE — 36000052 ZZH SURGERY LEVEL 2 EA 15 ADDTL MIN: Performed by: ORTHOPAEDIC SURGERY

## 2019-11-21 RX ORDER — HYDROMORPHONE HYDROCHLORIDE 1 MG/ML
.3-.5 INJECTION, SOLUTION INTRAMUSCULAR; INTRAVENOUS; SUBCUTANEOUS EVERY 5 MIN PRN
Status: DISCONTINUED | OUTPATIENT
Start: 2019-11-21 | End: 2019-11-22 | Stop reason: HOSPADM

## 2019-11-21 RX ORDER — ONDANSETRON 4 MG/1
4-8 TABLET, ORALLY DISINTEGRATING ORAL EVERY 8 HOURS PRN
Qty: 4 TABLET | Refills: 0 | Status: SHIPPED | OUTPATIENT
Start: 2019-11-21

## 2019-11-21 RX ORDER — HYDROXYZINE HYDROCHLORIDE 25 MG/1
25 TABLET, FILM COATED ORAL EVERY 6 HOURS PRN
Qty: 30 TABLET | Refills: 0 | Status: SHIPPED | OUTPATIENT
Start: 2019-11-21

## 2019-11-21 RX ORDER — ONDANSETRON 4 MG/1
4 TABLET, ORALLY DISINTEGRATING ORAL EVERY 30 MIN PRN
Status: DISCONTINUED | OUTPATIENT
Start: 2019-11-21 | End: 2019-11-22 | Stop reason: HOSPADM

## 2019-11-21 RX ORDER — SODIUM CHLORIDE, SODIUM LACTATE, POTASSIUM CHLORIDE, CALCIUM CHLORIDE 600; 310; 30; 20 MG/100ML; MG/100ML; MG/100ML; MG/100ML
INJECTION, SOLUTION INTRAVENOUS CONTINUOUS
Status: DISCONTINUED | OUTPATIENT
Start: 2019-11-21 | End: 2019-11-22

## 2019-11-21 RX ORDER — KETOROLAC TROMETHAMINE 30 MG/ML
30 INJECTION, SOLUTION INTRAMUSCULAR; INTRAVENOUS EVERY 6 HOURS PRN
Status: DISCONTINUED | OUTPATIENT
Start: 2019-11-21 | End: 2019-11-22 | Stop reason: HOSPADM

## 2019-11-21 RX ORDER — SODIUM CHLORIDE, SODIUM LACTATE, POTASSIUM CHLORIDE, CALCIUM CHLORIDE 600; 310; 30; 20 MG/100ML; MG/100ML; MG/100ML; MG/100ML
INJECTION, SOLUTION INTRAVENOUS CONTINUOUS
Status: DISCONTINUED | OUTPATIENT
Start: 2019-11-21 | End: 2019-11-22 | Stop reason: HOSPADM

## 2019-11-21 RX ORDER — LIDOCAINE 40 MG/G
CREAM TOPICAL
Status: DISCONTINUED | OUTPATIENT
Start: 2019-11-21 | End: 2019-11-22 | Stop reason: HOSPADM

## 2019-11-21 RX ORDER — DIMENHYDRINATE 50 MG/ML
25 INJECTION, SOLUTION INTRAMUSCULAR; INTRAVENOUS
Status: DISCONTINUED | OUTPATIENT
Start: 2019-11-21 | End: 2019-11-22 | Stop reason: HOSPADM

## 2019-11-21 RX ORDER — LABETALOL 20 MG/4 ML (5 MG/ML) INTRAVENOUS SYRINGE
10
Status: DISCONTINUED | OUTPATIENT
Start: 2019-11-21 | End: 2019-11-22 | Stop reason: HOSPADM

## 2019-11-21 RX ORDER — PROPOFOL 10 MG/ML
INJECTION, EMULSION INTRAVENOUS PRN
Status: DISCONTINUED | OUTPATIENT
Start: 2019-11-21 | End: 2019-11-21

## 2019-11-21 RX ORDER — PHENYLEPHRINE HYDROCHLORIDE 10 MG/ML
INJECTION INTRAVENOUS PRN
Status: DISCONTINUED | OUTPATIENT
Start: 2019-11-21 | End: 2019-11-21

## 2019-11-21 RX ORDER — CEFAZOLIN SODIUM IN 0.9 % NACL 3 G/100 ML
3 INTRAVENOUS SOLUTION, PIGGYBACK (ML) INTRAVENOUS
Status: COMPLETED | OUTPATIENT
Start: 2019-11-21 | End: 2019-11-21

## 2019-11-21 RX ORDER — FENTANYL CITRATE 50 UG/ML
INJECTION, SOLUTION INTRAMUSCULAR; INTRAVENOUS PRN
Status: DISCONTINUED | OUTPATIENT
Start: 2019-11-21 | End: 2019-11-21

## 2019-11-21 RX ORDER — ONDANSETRON 2 MG/ML
4 INJECTION INTRAMUSCULAR; INTRAVENOUS EVERY 30 MIN PRN
Status: DISCONTINUED | OUTPATIENT
Start: 2019-11-21 | End: 2019-11-22 | Stop reason: HOSPADM

## 2019-11-21 RX ORDER — OXYCODONE AND ACETAMINOPHEN 5; 325 MG/1; MG/1
1-2 TABLET ORAL EVERY 4 HOURS PRN
Qty: 30 TABLET | Refills: 0 | Status: SHIPPED | OUTPATIENT
Start: 2019-11-21

## 2019-11-21 RX ORDER — DEXAMETHASONE SODIUM PHOSPHATE 4 MG/ML
INJECTION, SOLUTION INTRA-ARTICULAR; INTRALESIONAL; INTRAMUSCULAR; INTRAVENOUS; SOFT TISSUE PRN
Status: DISCONTINUED | OUTPATIENT
Start: 2019-11-21 | End: 2019-11-21

## 2019-11-21 RX ORDER — HYDRALAZINE HYDROCHLORIDE 20 MG/ML
2.5-5 INJECTION INTRAMUSCULAR; INTRAVENOUS EVERY 10 MIN PRN
Status: DISCONTINUED | OUTPATIENT
Start: 2019-11-21 | End: 2019-11-22 | Stop reason: HOSPADM

## 2019-11-21 RX ORDER — ONDANSETRON 2 MG/ML
INJECTION INTRAMUSCULAR; INTRAVENOUS PRN
Status: DISCONTINUED | OUTPATIENT
Start: 2019-11-21 | End: 2019-11-21

## 2019-11-21 RX ORDER — MEPERIDINE HYDROCHLORIDE 50 MG/ML
12.5 INJECTION INTRAMUSCULAR; INTRAVENOUS; SUBCUTANEOUS EVERY 5 MIN PRN
Status: DISCONTINUED | OUTPATIENT
Start: 2019-11-21 | End: 2019-11-22 | Stop reason: HOSPADM

## 2019-11-21 RX ORDER — CELECOXIB 200 MG/1
200 CAPSULE ORAL DAILY
Qty: 60 CAPSULE | Refills: 0 | Status: SHIPPED | OUTPATIENT
Start: 2019-11-21 | End: 2019-12-05

## 2019-11-21 RX ORDER — LIDOCAINE 40 MG/G
CREAM TOPICAL
Status: DISCONTINUED | OUTPATIENT
Start: 2019-11-21 | End: 2019-11-22

## 2019-11-21 RX ORDER — FENTANYL CITRATE 50 UG/ML
100 INJECTION, SOLUTION INTRAMUSCULAR; INTRAVENOUS ONCE
Status: COMPLETED | OUTPATIENT
Start: 2019-11-21 | End: 2019-11-21

## 2019-11-21 RX ORDER — GLYCOPYRROLATE 0.2 MG/ML
INJECTION, SOLUTION INTRAMUSCULAR; INTRAVENOUS PRN
Status: DISCONTINUED | OUTPATIENT
Start: 2019-11-21 | End: 2019-11-21

## 2019-11-21 RX ORDER — ALBUTEROL SULFATE 0.83 MG/ML
2.5 SOLUTION RESPIRATORY (INHALATION) EVERY 4 HOURS PRN
Status: DISCONTINUED | OUTPATIENT
Start: 2019-11-21 | End: 2019-11-22 | Stop reason: HOSPADM

## 2019-11-21 RX ORDER — CEFAZOLIN SODIUM 1 G/3ML
1 INJECTION, POWDER, FOR SOLUTION INTRAMUSCULAR; INTRAVENOUS SEE ADMIN INSTRUCTIONS
Status: DISCONTINUED | OUTPATIENT
Start: 2019-11-21 | End: 2019-11-22

## 2019-11-21 RX ORDER — DIAZEPAM 10 MG/2ML
2.5 INJECTION, SOLUTION INTRAMUSCULAR; INTRAVENOUS
Status: DISCONTINUED | OUTPATIENT
Start: 2019-11-21 | End: 2019-11-22 | Stop reason: HOSPADM

## 2019-11-21 RX ORDER — FENTANYL CITRATE 50 UG/ML
25-50 INJECTION, SOLUTION INTRAMUSCULAR; INTRAVENOUS
Status: DISCONTINUED | OUTPATIENT
Start: 2019-11-21 | End: 2019-11-22 | Stop reason: HOSPADM

## 2019-11-21 RX ADMIN — FENTANYL CITRATE 100 MCG: 50 INJECTION, SOLUTION INTRAMUSCULAR; INTRAVENOUS at 22:41

## 2019-11-21 RX ADMIN — SODIUM CHLORIDE, POTASSIUM CHLORIDE, SODIUM LACTATE AND CALCIUM CHLORIDE: 600; 310; 30; 20 INJECTION, SOLUTION INTRAVENOUS at 22:38

## 2019-11-21 RX ADMIN — ONDANSETRON HYDROCHLORIDE 4 MG: 2 INJECTION, SOLUTION INTRAVENOUS at 22:41

## 2019-11-21 RX ADMIN — HYDROMORPHONE HYDROCHLORIDE 1 MG: 1 INJECTION, SOLUTION INTRAMUSCULAR; INTRAVENOUS; SUBCUTANEOUS at 22:41

## 2019-11-21 RX ADMIN — FENTANYL CITRATE 100 MCG: 50 INJECTION, SOLUTION INTRAMUSCULAR; INTRAVENOUS at 19:25

## 2019-11-21 RX ADMIN — PHENYLEPHRINE HYDROCHLORIDE 100 MCG: 10 INJECTION INTRAVENOUS at 22:46

## 2019-11-21 RX ADMIN — PROPOFOL 200 MG: 10 INJECTION, EMULSION INTRAVENOUS at 22:41

## 2019-11-21 RX ADMIN — DEXAMETHASONE SODIUM PHOSPHATE 4 MG: 4 INJECTION, SOLUTION INTRA-ARTICULAR; INTRALESIONAL; INTRAMUSCULAR; INTRAVENOUS; SOFT TISSUE at 22:41

## 2019-11-21 RX ADMIN — LIDOCAINE HYDROCHLORIDE 50 MG: 10 INJECTION, SOLUTION EPIDURAL; INFILTRATION; INTRACAUDAL; PERINEURAL at 22:41

## 2019-11-21 RX ADMIN — PHENYLEPHRINE HYDROCHLORIDE 100 MCG: 10 INJECTION INTRAVENOUS at 22:57

## 2019-11-21 RX ADMIN — KETOROLAC TROMETHAMINE 30 MG: 30 INJECTION, SOLUTION INTRAMUSCULAR at 19:23

## 2019-11-21 RX ADMIN — PHENYLEPHRINE HYDROCHLORIDE 100 MCG: 10 INJECTION INTRAVENOUS at 22:50

## 2019-11-21 RX ADMIN — HYDROMORPHONE HYDROCHLORIDE 0.5 MG: 1 INJECTION, SOLUTION INTRAMUSCULAR; INTRAVENOUS; SUBCUTANEOUS at 21:05

## 2019-11-21 RX ADMIN — MIDAZOLAM 1 MG: 1 INJECTION INTRAMUSCULAR; INTRAVENOUS at 22:38

## 2019-11-21 RX ADMIN — GLYCOPYRROLATE 0.2 MG: 0.2 INJECTION, SOLUTION INTRAMUSCULAR; INTRAVENOUS at 22:41

## 2019-11-21 RX ADMIN — Medication 3 G: at 22:44

## 2019-11-21 RX ADMIN — HYDROMORPHONE HYDROCHLORIDE 0.5 MG: 1 INJECTION, SOLUTION INTRAMUSCULAR; INTRAVENOUS; SUBCUTANEOUS at 20:42

## 2019-11-21 ASSESSMENT — COPD QUESTIONNAIRES
CAT_SEVERITY: MODERATE
COPD: 1

## 2019-11-21 ASSESSMENT — MIFFLIN-ST. JEOR: SCORE: 2007.77

## 2019-11-22 ENCOUNTER — APPOINTMENT (OUTPATIENT)
Dept: PHYSICAL THERAPY | Facility: CLINIC | Age: 69
End: 2019-11-22
Attending: ORTHOPAEDIC SURGERY
Payer: MEDICARE

## 2019-11-22 VITALS
DIASTOLIC BLOOD PRESSURE: 64 MMHG | WEIGHT: 275 LBS | SYSTOLIC BLOOD PRESSURE: 127 MMHG | HEART RATE: 90 BPM | RESPIRATION RATE: 16 BRPM | OXYGEN SATURATION: 94 % | BODY MASS INDEX: 40.73 KG/M2 | TEMPERATURE: 98.3 F | HEIGHT: 69 IN

## 2019-11-22 PROBLEM — Z96.651 HISTORY OF REVISION OF TOTAL REPLACEMENT OF RIGHT KNEE JOINT: Status: ACTIVE | Noted: 2019-11-22

## 2019-11-22 LAB — HGB BLD-MCNC: 8.3 G/DL (ref 13.3–17.7)

## 2019-11-22 PROCEDURE — 97110 THERAPEUTIC EXERCISES: CPT | Mod: GP | Performed by: PHYSICAL THERAPIST

## 2019-11-22 PROCEDURE — 25000128 H RX IP 250 OP 636: Performed by: ORTHOPAEDIC SURGERY

## 2019-11-22 PROCEDURE — 85018 HEMOGLOBIN: CPT | Performed by: ORTHOPAEDIC SURGERY

## 2019-11-22 PROCEDURE — 97530 THERAPEUTIC ACTIVITIES: CPT | Mod: GP | Performed by: PHYSICAL THERAPIST

## 2019-11-22 PROCEDURE — 97161 PT EVAL LOW COMPLEX 20 MIN: CPT | Mod: GP | Performed by: PHYSICAL THERAPIST

## 2019-11-22 PROCEDURE — 36416 COLLJ CAPILLARY BLOOD SPEC: CPT | Performed by: ORTHOPAEDIC SURGERY

## 2019-11-22 PROCEDURE — 97116 GAIT TRAINING THERAPY: CPT | Mod: GP,59 | Performed by: PHYSICAL THERAPIST

## 2019-11-22 PROCEDURE — 25800030 ZZH RX IP 258 OP 636: Performed by: ORTHOPAEDIC SURGERY

## 2019-11-22 PROCEDURE — 25000132 ZZH RX MED GY IP 250 OP 250 PS 637: Mod: GY | Performed by: ORTHOPAEDIC SURGERY

## 2019-11-22 RX ORDER — NALOXONE HYDROCHLORIDE 0.4 MG/ML
.1-.4 INJECTION, SOLUTION INTRAMUSCULAR; INTRAVENOUS; SUBCUTANEOUS
Status: DISCONTINUED | OUTPATIENT
Start: 2019-11-22 | End: 2019-11-22 | Stop reason: HOSPADM

## 2019-11-22 RX ORDER — ALBUTEROL SULFATE 0.83 MG/ML
2.5 SOLUTION RESPIRATORY (INHALATION) EVERY 4 HOURS PRN
COMMUNITY

## 2019-11-22 RX ORDER — TRIAMTERENE/HYDROCHLOROTHIAZID 37.5-25 MG
1 TABLET ORAL DAILY
Status: DISCONTINUED | OUTPATIENT
Start: 2019-11-22 | End: 2019-11-22 | Stop reason: HOSPADM

## 2019-11-22 RX ORDER — CEFAZOLIN SODIUM 2 G/100ML
2 INJECTION, SOLUTION INTRAVENOUS EVERY 8 HOURS
Status: COMPLETED | OUTPATIENT
Start: 2019-11-22 | End: 2019-11-22

## 2019-11-22 RX ORDER — HYDROMORPHONE HYDROCHLORIDE 1 MG/ML
0.2 INJECTION, SOLUTION INTRAMUSCULAR; INTRAVENOUS; SUBCUTANEOUS
Status: DISCONTINUED | OUTPATIENT
Start: 2019-11-22 | End: 2019-11-22 | Stop reason: HOSPADM

## 2019-11-22 RX ORDER — LIDOCAINE 40 MG/G
CREAM TOPICAL
Status: DISCONTINUED | OUTPATIENT
Start: 2019-11-22 | End: 2019-11-22 | Stop reason: HOSPADM

## 2019-11-22 RX ORDER — CALCIUM CARBONATE 500 MG/1
1000 TABLET, CHEWABLE ORAL 4 TIMES DAILY PRN
Status: DISCONTINUED | OUTPATIENT
Start: 2019-11-22 | End: 2019-11-22 | Stop reason: HOSPADM

## 2019-11-22 RX ORDER — METOPROLOL TARTRATE 50 MG
50 TABLET ORAL 2 TIMES DAILY
Status: DISCONTINUED | OUTPATIENT
Start: 2019-11-22 | End: 2019-11-22 | Stop reason: HOSPADM

## 2019-11-22 RX ORDER — CLONAZEPAM 1 MG/1
1 TABLET ORAL 3 TIMES DAILY PRN
Status: DISCONTINUED | OUTPATIENT
Start: 2019-11-22 | End: 2019-11-22 | Stop reason: HOSPADM

## 2019-11-22 RX ORDER — PROCHLORPERAZINE MALEATE 5 MG
5 TABLET ORAL EVERY 6 HOURS PRN
Status: DISCONTINUED | OUTPATIENT
Start: 2019-11-22 | End: 2019-11-22 | Stop reason: HOSPADM

## 2019-11-22 RX ORDER — OXYCODONE AND ACETAMINOPHEN 5; 325 MG/1; MG/1
1-2 TABLET ORAL EVERY 4 HOURS PRN
Status: DISCONTINUED | OUTPATIENT
Start: 2019-11-22 | End: 2019-11-22 | Stop reason: HOSPADM

## 2019-11-22 RX ORDER — SODIUM CHLORIDE 9 MG/ML
INJECTION, SOLUTION INTRAVENOUS CONTINUOUS
Status: DISCONTINUED | OUTPATIENT
Start: 2019-11-22 | End: 2019-11-22 | Stop reason: HOSPADM

## 2019-11-22 RX ORDER — ONDANSETRON 4 MG/1
4 TABLET, ORALLY DISINTEGRATING ORAL EVERY 6 HOURS PRN
Status: DISCONTINUED | OUTPATIENT
Start: 2019-11-22 | End: 2019-11-22 | Stop reason: HOSPADM

## 2019-11-22 RX ORDER — HYDROXYZINE HYDROCHLORIDE 25 MG/1
25-50 TABLET, FILM COATED ORAL EVERY 6 HOURS PRN
Status: DISCONTINUED | OUTPATIENT
Start: 2019-11-22 | End: 2019-11-22

## 2019-11-22 RX ORDER — OXYCODONE HYDROCHLORIDE 5 MG/1
5-10 TABLET ORAL
Status: DISCONTINUED | OUTPATIENT
Start: 2019-11-22 | End: 2019-11-22 | Stop reason: HOSPADM

## 2019-11-22 RX ORDER — ONDANSETRON 2 MG/ML
4 INJECTION INTRAMUSCULAR; INTRAVENOUS EVERY 6 HOURS PRN
Status: DISCONTINUED | OUTPATIENT
Start: 2019-11-22 | End: 2019-11-22 | Stop reason: HOSPADM

## 2019-11-22 RX ORDER — GUAIFENESIN AND DEXTROMETHORPHAN HYDROBROMIDE 600; 30 MG/1; MG/1
1 TABLET, EXTENDED RELEASE ORAL EVERY 12 HOURS
Status: DISCONTINUED | OUTPATIENT
Start: 2019-11-22 | End: 2019-11-22 | Stop reason: HOSPADM

## 2019-11-22 RX ORDER — AMOXICILLIN 250 MG
1 CAPSULE ORAL 2 TIMES DAILY
Status: DISCONTINUED | OUTPATIENT
Start: 2019-11-22 | End: 2019-11-22 | Stop reason: HOSPADM

## 2019-11-22 RX ADMIN — OXYCODONE HYDROCHLORIDE 10 MG: 5 TABLET ORAL at 08:48

## 2019-11-22 RX ADMIN — CEFAZOLIN SODIUM 2 G: 2 INJECTION, SOLUTION INTRAVENOUS at 13:37

## 2019-11-22 RX ADMIN — CEFAZOLIN SODIUM 2 G: 2 INJECTION, SOLUTION INTRAVENOUS at 06:16

## 2019-11-22 RX ADMIN — TRIAMTERENE AND HYDROCHLOROTHIAZIDE 1 TABLET: 37.5; 25 TABLET ORAL at 08:56

## 2019-11-22 RX ADMIN — OXYCODONE HYDROCHLORIDE 10 MG: 5 TABLET ORAL at 01:22

## 2019-11-22 RX ADMIN — GUAIFENESIN AND DEXTROMETHORPHAN HYDROBROMIDE 1 TABLET: 600; 30 TABLET, EXTENDED RELEASE ORAL at 06:15

## 2019-11-22 RX ADMIN — OXYCODONE HYDROCHLORIDE 10 MG: 5 TABLET ORAL at 05:00

## 2019-11-22 RX ADMIN — ASPIRIN 325 MG: 325 TABLET, DELAYED RELEASE ORAL at 08:49

## 2019-11-22 RX ADMIN — OXYCODONE HYDROCHLORIDE 10 MG: 5 TABLET ORAL at 12:34

## 2019-11-22 RX ADMIN — SODIUM CHLORIDE: 9 INJECTION, SOLUTION INTRAVENOUS at 01:24

## 2019-11-22 RX ADMIN — SENNOSIDES AND DOCUSATE SODIUM 1 TABLET: 8.6; 5 TABLET ORAL at 08:49

## 2019-11-22 RX ADMIN — Medication 140 MG: at 08:56

## 2019-11-22 RX ADMIN — METOPROLOL TARTRATE 50 MG: 50 TABLET ORAL at 08:56

## 2019-11-22 NOTE — PROGRESS NOTES
"[facility]  Orthopedics Progress Note       S:  POD #  1 s/p INCISION AND DRAINAGE RIGHT TOTAL KNEE WITH WOUND CLOSURE.  Patient reports he is doing well.  He feels his pain is better controlled at this time.  He is scheduled to meet with PT today.  He is planning to go home upon discharge.      O:  Blood pressure (!) 152/74, pulse 90, temperature 98.1  F (36.7  C), temperature source Oral, resp. rate 16, height 1.753 m (5' 9\"), weight 124.7 kg (275 lb), SpO2 97 %.  Lab Results   Component Value Date    HGB 7.9 11/14/2019       Patient is sitting in bed with leg lying on pillows.  He is in no acute distress.  His dressing is clean dry and intact.  He is able to move the leg and wiggle the toes without difficulty.  Neurovascular exam is intact.  His calves are soft and nontender to palpation.      A/P:  POD #1  s/p INCISION AND DRAINAGE RIGHT TOTAL KNEE WITH WOUND CLOSURE.  Weightbearing as tolerated  PT for gait training, ADL's and home exercises  Oral medication as needed for pain  Follow up with orthopedics as advised by Dr Pierce  Patient may discharge home later today, when cleared by PT and pain is controlled    Arturo Arciniega PA-C    "

## 2019-11-22 NOTE — PROGRESS NOTES
[facility]      OUTPATIENT PHYSICAL THERAPY EVALUATION  PLAN OF TREATMENT FOR OUTPATIENT REHABILITATION  (COMPLETE FOR INITIAL CLAIMS ONLY)  Patient's Last Name, First Name, M.I.  YOB: 1950  Douglas Jin                           Provider's Name  [facility] Medical Record No.  [id number]                               Onset Date:  11/21/19   Start of Care Date:         Type:     _X_PT   ___OT   ___SLP Medical Diagnosis:                           PT Diagnosis:  difficulty walking   Visits from SOC:  1   _________________________________________________________________________________  Plan of Treatment/Functional Goals    Planned Interventions:  ,    gait training, ROM, strengthening, transfer training,       Goals: See Physical Therapy Goals on Care Plan in [facility] electronic health record.    Therapy Frequency: Daily  Predicted Duration of Therapy Intervention: 1 day  _________________________________________________________________________________    I CERTIFY THE NEED FOR THESE SERVICES FURNISHED UNDER        THIS PLAN OF TREATMENT AND WHILE UNDER MY CARE     (Physician co-signature of this document indicates review and certification of the therapy plan).                 ,      Referring Physician: Abad Pierce            Initial Assessment        See Physical Therapy evaluation dated   in Epic electronic health record.

## 2019-11-22 NOTE — ANESTHESIA POSTPROCEDURE EVALUATION
Patient: Douglas Jin    Procedure(s):  INCISION AND DRAINAGE RIGHT TOTAL KNEE WITH WOUND CLOSURE.  CLOSURE, WOUND, LOWER EXTREMITY, SECONDARY    Diagnosis:* No pre-op diagnosis entered *  Diagnosis Additional Information: No value filed.    Anesthesia Type:  General, LMA    Note:  Anesthesia Post Evaluation    Patient location during evaluation: PACU  Patient participation: Able to fully participate in evaluation  Level of consciousness: sleepy but conscious  Pain management: adequate  multimodal analgesia used between 6 hours prior to anesthesia start to PACU dischargeAirway patency: patent  Cardiovascular status: acceptable  Respiratory status: acceptable  Hydration status: acceptable  PONV: none             Last vitals:  Vitals:    11/21/19 2212 11/21/19 2214 11/21/19 2215   BP:      Pulse:      Resp:      Temp:      SpO2: 100% 100% 97%         Electronically Signed By: Miguel A Monk MD  November 21, 2019  11:35 PM

## 2019-11-22 NOTE — ANESTHESIA PREPROCEDURE EVALUATION
Anesthesia Pre-Procedure Evaluation    Patient: Douglas Jin   MRN: [id number] : 1950          Preoperative Diagnosis: * No pre-op diagnosis entered *    Procedure(s):  INCISION AND DRAINAGE RIGHT TOTAL KNEE WITH WOUND CLOSURE.  CLOSURE, WOUND, LOWER EXTREMITY, SECONDARY    Past Medical History:   Diagnosis Date     Arthritis      Chronic back pain      Constipation      COPD (chronic obstructive pulmonary disease) (H)      History of blood transfusion      Hypertension      Other chronic pain     low back     Pneumonia     recent bronchitis 10/2019     Past Surgical History:   Procedure Laterality Date     ARTHROPLASTY KNEE Right 2015    Procedure: ARTHROPLASTY KNEE;  Surgeon: Eric Pierce MD;  Location: [facility] OR     ARTHROPLASTY REVISION KNEE Right 2019    Procedure: Right total knee arthroplasty revision using a Casi LCCK implant;  Surgeon: Eric Pierce MD;  Location: [facility] OR     FUSION SPINE POSTERIOR MINIMALLY INVASIVE ONE LEVEL      L4-L5     HERNIA REPAIR, INGUINAL RT/LT Left      SINUS SURGERY       TONSILLECTOMY      as a child     Anesthesia Evaluation     . Pt has had prior anesthetic. Type: General and Regional    No history of anesthetic complications          ROS/MED HX    ENT/Pulmonary:     (+)BEAN risk factors hypertension, obese, moderate COPD, , recent URI resolved . .    Neurologic:  - neg neurologic ROS     Cardiovascular:     (+) hypertension----. : . . . :. .       METS/Exercise Tolerance:     Hematologic:  - neg hematologic  ROS       Musculoskeletal:   (+) arthritis, fracture lower extremity: Other (Comment) (metatarsal malunion), -       GI/Hepatic:     (+) hepatitis type Alcoholic,       Renal/Genitourinary:     (+) chronic renal disease, type: CRI, Pt does not require dialysis, Pt has no history of transplant,       Endo: Comment: Class 3 obesity    (+) Obesity, .      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)      Infectious Disease:  - neg infectious disease ROS       Malignancy:      - no malignancy   Other:    - neg other ROS                      Physical Exam  Normal systems: cardiovascular    Airway   Mallampati: III  TM distance: >3 FB  Neck ROM: full    Dental   (+) chipped and missing  Comment: Poor dentition    Cardiovascular   Rhythm and rate: regular and normal  (-) no murmur    Pulmonary (+) rhonchi   (-) no wheezes    Other findings: Lab Test        11/15/19     11/14/19     11/14/19     11/13/19     11/12/19     07/23/19                       0659          1722          0558          0615          1158          1318          WBC           --           --           --           --           --          9.3           HGB           --          7.9*         7.5*         8.8*         10.4*        11.4*         MCV           --           --           --           --           --          79            PLT          194           --           --           --          300          237            Lab Test        11/14/19     11/12/19     07/31/15     07/29/15                       0558          1158          0538          1151          NA           138           --           --           --           POTASSIUM    4.0           --           --           --           CHLORIDE     108           --           --           --           CO2          25            --           --           --           BUN          23            --           --           --           CR           1.28*        1.18          --          1.21          ANIONGAP     5             --           --           --           DIANA          8.1*          --           --           --           GLC          128*          --          114*          --                  ECG normal 11/6          Lab Results   Component Value Date    WBC 9.3 07/23/2019    HGB 7.9 (L) 11/14/2019    HCT 38.8 (L) 07/23/2019     11/15/2019     "CRP 15.6 (H) 07/23/2019    SED 15 07/23/2019     11/14/2019    POTASSIUM 4.0 11/14/2019    CHLORIDE 108 11/14/2019    CO2 25 11/14/2019    BUN 23 11/14/2019    CR 1.28 (H) 11/14/2019     (H) 11/14/2019    DIANA 8.1 (L) 11/14/2019       Preop Vitals  BP Readings from Last 3 Encounters:   11/21/19 (!) 152/79   11/15/19 (!) 168/70   08/01/15 131/61    Pulse Readings from Last 3 Encounters:   11/15/19 96      Resp Readings from Last 3 Encounters:   11/21/19 18   11/15/19 16   08/01/15 16    SpO2 Readings from Last 3 Encounters:   11/21/19 96%   11/15/19 94%   08/01/15 94%      Temp Readings from Last 1 Encounters:   11/21/19 97.5  F (36.4  C) (Temporal)    Ht Readings from Last 1 Encounters:   11/21/19 1.753 m (5' 9\")      Wt Readings from Last 1 Encounters:   11/21/19 124.7 kg (275 lb)    Estimated body mass index is 40.61 kg/m  as calculated from the following:    Height as of this encounter: 1.753 m (5' 9\").    Weight as of this encounter: 124.7 kg (275 lb).       Anesthesia Plan      History & Physical Review  History and physical reviewed and following examination; no interval change.    ASA Status:  3 .    NPO Status:  > 8 hours    Plan for General and LMA with Intravenous induction. Maintenance will be Balanced.    PONV prophylaxis:  Ondansetron (or other 5HT-3)       Postoperative Care  Postoperative pain management:  IV analgesics, Oral pain medications and Multi-modal analgesia.      Consents  Anesthetic plan, risks, benefits and alternatives discussed with:  Patient.  Use of blood products discussed: No .   .                 Miguel A Monk MD                    .  "

## 2019-11-22 NOTE — PLAN OF CARE
Patient plan: Pt and spouse plan on discharge to home with OP PT  Current status: PT eval and treatment completed, pt admitted from TCU for I&D and sound closure of recent TKA, Pt previously I at apt with spouse with 18 steps to enter.    Pt currently able to complete bed mob with SBA, transfers with FWW with SBA and cues for safety, amb with SBA with FWW >300', able to amb 22 steps with 1-2 rails and SBA, pt completes ex with HO and cues, spouse present throughout session for education and observation, pt and spouse both feel comfortable to return home.  Anticipated status at discharge: Pt will have wide WW issued and spouse will be able to provide SBA as needed for mob and a for household task and bring pt to OP PT      Physical Therapy Discharge Summary    Reason for therapy discharge:    Discharged to home with outpatient therapy.    Progress towards therapy goal(s). See goals on Care Plan in [facility] electronic health record for goal details.  Goals met    Therapy recommendation(s):    Continued therapy is recommended.  Rationale/Recommendations:  to maximize surgical outcome and functional mob ease and I.

## 2019-11-22 NOTE — PHARMACY-ADMISSION MEDICATION HISTORY
Admission medication history interview status for this patient is complete. See [facility] admission navigator for allergy information, prior to admission medications and immunization status.     Medication history interview source(s):Patient  Medication history resources (including written lists, pill bottles, clinic record):None  Primary pharmacy: [facility] [address] in [place]    Changes made to PTA medication list:  Added: Albuterol  Deleted: Hydroxyzine  Changed: None    Actions taken by pharmacist (provider contacted, etc):None     Additional medication history information:None    Medication reconciliation/reorder completed by provider prior to medication history?  Yes     Do you take OTC medications (eg tylenol, ibuprofen, fish oil, eye/ear drops, etc)? Yes     For patients on insulin therapy: No      Prior to Admission medications    Medication Sig Last Dose Taking? Auth Provider   albuterol (PROVENTIL) (2.5 MG/3ML) 0.083% neb solution Take 2.5 mg by nebulization every 4 hours as needed for shortness of breath / dyspnea or wheezing Past Week at Unknown time Yes Unknown, Entered By History   aspirin (ASA) 325 MG EC tablet Take 1 tablet (325 mg) by mouth 2 times daily  Yes Eric Pierce MD   aspirin (ASA) 325 MG EC tablet Take one Aspirin tab twice daily for 5 weeks. 11/21/2019 at Unknown time Yes Eric Pierce MD   budesonide-formoterol (SYMBICORT) 80-4.5 MCG/ACT Inhaler Inhale 2 puffs into the lungs 2 times daily And may take additional 1-2 puffs per day as needed 11/21/2019 at Unknown time Yes Reported, Patient   celecoxib (CELEBREX) 200 MG capsule Take 1 capsule (200 mg) by mouth daily for 14 days Do not take within 6 hours of ibuprofen (MOTRIN, ADVIL) or ketorolac (TORADOL) if prescribed.  Yes Eric Pierce MD   clonazePAM (KLONOPIN) 1 MG tablet Take 1 tablet (1 mg) by mouth 3 times daily as needed for anxiety 11/20/2019 at Unknown time Yes Ehsan Hopkins MD    dextromethorphan-guaiFENesin (MUCINEX DM)  MG 12 hr tablet Take 1 tablet by mouth every 12 hours 11/21/2019 at Unknown time Yes Reported, Patient   ferrous sulfate (SLO-FE) 142 (45 Fe) MG CR tablet Take 1 tablet (142 mg) by mouth daily 11/20/2019 at Unknown time Yes Ehsan Hopkins MD   hydrOXYzine (ATARAX) 25 MG tablet Take 1 tablet (25 mg) by mouth every 6 hours as needed for itching or anxiety (with pain, moderate pain)  Yes Eric Pierce MD   METOPROLOL TARTRATE PO Take 50 mg by mouth 2 times daily 11/21/2019 at Unknown time Yes Reported, Patient   ondansetron (ZOFRAN-ODT) 4 MG ODT tab Take 1-2 tablets (4-8 mg) by mouth every 8 hours as needed for nausea Dissolve ON the tongue.  Yes Eric Pierce MD   oxyCODONE-acetaminophen (PERCOCET) 5-325 MG tablet Take 1-2 tablets by mouth every 4 hours as needed for pain (moderate to severe)  Yes Eric Pierce MD   oxyCODONE-acetaminophen (PERCOCET) 5-325 MG tablet Take 1-2 tablets by mouth every 4 hours as needed for severe pain 11/21/2019 at 1630 Yes Eric Pierce MD   senna-docusate (SENOKOT-S/PERICOLACE) 8.6-50 MG tablet Take 1 tablet by mouth 2 times daily 11/20/2019 at Unknown time Yes Eric Pierce MD   triamterene-hydrochlorothiazide (MAXZIDE-25) 37.5-25 MG per tablet Take 1 tablet by mouth daily Unknown at Unknown time  Reported, Patient

## 2019-11-22 NOTE — PROGRESS NOTES
Patient vital signs are at baseline: Yes  Patient able to ambulate as they were prior to admission or with assist devices provided by therapies during their stay:  No,  Reason:  Has not stood or dangled yet post-op.   Patient MUST void prior to discharge:  Yes; voiding in small amounts, low residual.   Patient able to tolerate oral intake:  Yes; reg diet, no nausea.  Pain has adequate pain control using Oral analgesics:  Yes; Oxy 10mg started for pain management post-op.     Pt arrived to the floor around 0045 from PACU, post-op I&D of R knee, R TKA done on 11/19/19 of this week, pt reports that TCU took poor care of patient's recovery and returned for I&D this evening. Wife, Carlyn at bedside.

## 2019-11-22 NOTE — BRIEF OP NOTE
[facility]    Brief Operative Note    Pre-operative diagnosis: * No pre-op diagnosis entered *  Post-operative diagnosis Same as pre-operative diagnosis    Procedure: Procedure(s):  INCISION AND DRAINAGE RIGHT TOTAL KNEE WITH WOUND CLOSURE.  CLOSURE, WOUND, LOWER EXTREMITY, SECONDARY  Surgeon: Surgeon(s) and Role:     * Eric Pierce MD - Primary  Anesthesia: General   Estimated blood loss: Minimal  Drains: None  Specimens:   ID Type Source Tests Collected by Time Destination   1 :  Fluid Knee, Right ANAEROBIC BACTERIAL CULTURE Eric Pierce MD 11/21/2019 11:07 PM    2 :  Fluid Knee, Right FLUID CULTURE AEROBIC BACTERIAL Eric Pierce MD 11/21/2019 11:07 PM      Findings:   None.  Complications: None.  Implants: * No implants in log *

## 2019-11-22 NOTE — PROGRESS NOTES
11/22/19 0900   Quick Adds   Type of Visit Initial PT Evaluation   Living Environment   Lives With spouse   Living Arrangements apartment   Home Accessibility stairs to enter home   Number of Stairs, Main Entrance   (18)   Stair Railings, Main Entrance railings on both sides of stairs   Transportation Anticipated family or friend will provide   Functional Level Prior   Ambulation 1-->assistive equipment   Transferring 1-->assistive equipment   Toileting 0-->independent   Bathing 1-->assistive equipment   Communication 0-->understands/communicates without difficulty   Swallowing 0-->swallows foods/liquids without difficulty   Cognition 0 - no cognition issues reported   Fall history within last six months no   Prior Functional Level Comment patient reports recent difficulty with mobility since stress fracture on L   General Information   Onset of Illness/Injury or Date of Surgery - Date 11/21/19   Referring Physician Abad Pierce   Patient/Family Goals Statement pt plans on dc to home with spouse   Pertinent History of Current Problem (include personal factors and/or comorbidities that impact the POC) s/p I&D and wound closure from recent R TKA 1 week ago   Precautions/Limitations no known precautions/limitations   Weight-Bearing Status - LUE full weight-bearing   Weight-Bearing Status - RUE full weight-bearing   Weight-Bearing Status - LLE weight-bearing as tolerated   Weight-Bearing Status - RLE weight-bearing as tolerated   General Observations Pt with CAM boot from recent stress fx   General Info Comments pt reports TCU kept KI on druing his stay   Cognitive Status Examination   Orientation orientation to person, place and time   Level of Consciousness alert   Follows Commands and Answers Questions 100% of the time   Personal Safety and Judgment intact   Memory intact   Pain Assessment   Patient Currently in Pain Yes, see Vital Sign flowsheet  (minimal)   Integumentary/Edema   Integumentary/Edema Comments  "R knee per surgery, bandaged   Range of Motion (ROM)   ROM Comment R knee ROM 5- 73 degrees    Strength   Strength Comments decreased R LE strength post op, able to lift antigravity    Bed Mobility   Bed Mobility Comments bed mob with SBA light use of rail   Transfer Skills   Transfer Comments sit to stand with SBA and FWW cues for hand placement   Gait   Gait Comments amb with FWW with SBA to mod I, cues for safety   General Therapy Interventions   Planned Therapy Interventions gait training;ROM;strengthening;transfer training   Clinical Impression   Criteria for Skilled Therapeutic Intervention yes, treatment indicated   PT Diagnosis difficulty walking   Influenced by the following impairments post op pain, decreased ROM, strength, and act mariaa   Functional limitations due to impairments impaired functional mob I and safety   Clinical Presentation Stable/Uncomplicated   Clinical Presentation Rationale clinical judgement   Clinical Decision Making (Complexity) Low complexity   Therapy Frequency Daily   Predicted Duration of Therapy Intervention (days/wks) 1 day   Anticipated Equipment Needs at Discharge front wheeled walker   Anticipated Discharge Disposition Home with Outpatient Therapy;Home with Assist   Risk & Benefits of therapy have been explained Yes   Patient, Family & other staff in agreement with plan of care Yes   [facility] [employer] TM \"6 Clicks\"   2016, Trustees of [facility], under license to [employer].  All rights reserved.   6 Clicks Short Forms Basic Mobility Inpatient Short Form   [facility]  \"6 Clicks\" V.2 Basic Mobility Inpatient Short Form   1. Turning from your back to your side while in a flat bed without using bedrails? 4 - None   2. Moving from lying on your back to sitting on the side of a flat bed without using bedrails? 4 - None   3. Moving to and from a bed to a chair (including a wheelchair)? 4 - None   4. Standing up from a chair using your arms (e.g., " wheelchair, or bedside chair)? 4 - None   5. To walk in hospital room? 4 - None   6. Climbing 3-5 steps with a railing? 4 - None   Basic Mobility Raw Score (Score out of 24.Lower scores equate to lower levels of function) 24   Total Evaluation Time   Total Evaluation Time (Minutes) 10

## 2019-11-22 NOTE — PROGRESS NOTES
Patient vital signs are at baseline: No,  Reason:  SATs below 90 without 2 L O2   Patient able to ambulate as they were prior to admission or with assist devices provided by therapies during their stay:  Yes; up Ax2 walker, GB, CAM boot & R foot sneaker to bathroom. 1 BM this morning.  Patient MUST void prior to discharge:  Yes  Patient able to tolerate oral intake:  Yes  Pain has adequate pain control using Oral analgesics:  Yes

## 2019-11-22 NOTE — OP NOTE
Procedure Date: 11/21/2019      PREOPERATIVE DIAGNOSIS:  Right knee hematoma, status post right total knee arthroplasty.      POSTOPERATIVE DIAGNOSIS:  Right knee hematoma, status post right total knee arthroplasty.      PROCEDURE:  Incision and drainage of right knee hematoma with placement of antibiotics and primary wound closure.      SURGEON:  Eric Pierce MD      INDICATIONS:  Mr. Jin is a very pleasant 68-year-old gentleman who had a right total knee arthroplasty revision done by me a week ago.  He has done well postoperatively, but this morning had some drainage from the distal end of his incision.  His incision otherwise appeared well-healed and well-fixed.  There was no erythema.  He has not been sick.  He has a bit of a boggy knee given his revision, and I was concerned about a draining hematoma which may become infected, so the decision was made to proceed with evacuation of the hematoma and reclosure of the distal end of his wound.  He understands the risks, benefits and alternatives and wished to proceed with surgery.      NARRATIVE OF EVENTS:  After thorough preoperative evaluation and proper identification of patient's extremity to be operated on, Mr. Jin was taken to the operating room, where he underwent a general anesthetic.  He was placed supine on the operating table, and right leg was prepped and draped in the usual sterile manner.  After appropriate surgical pause to confirm the patient's extremity to be operated on, the patient received 2 grams of Ancef.  His right leg was approached through the previous distal end of his incision.  The distal 2 inches of the incision were opened, and a large hematoma was encountered.  Some of this was evacuated and sent for culture, and culture swabs as well as a fluid culture were sent.  The hematoma was then evacuated, and it was surgically irrigated with 3 liters of sterile saline.  Once this was done, the proximal end of the wound, in doing so,  was found to be watertight.  At this point, we then placed a gram of vancomycin powder into the wound and then re-closed it with 2-0 Monocryl and 2-0 nylon sutures in the skin.  The patient was placed in a well-padded postoperative dressing and taken to the recovery room in stable condition.  He tolerated the procedure without difficulty.      Revised surgeon name 2019  jw TIERNEY MD             D: 2019   T: 2019   MT: DENG      Name:     PORTIA LOPEZ   MRN:      [id number]        Account:        [id number]   :      1950           Procedure Date: 2019      Document: [id number]

## 2019-11-22 NOTE — PROGRESS NOTES
Patient's After Visit Summary was reviewed with patient and/or spouse.   Patient verbalized understanding of After Visit Summary, recommended follow up and was given an opportunity to ask questions.   Discharge medications sent home with patient/family: YES (Celebrex, Aspirin, Atarax, and Percocet (script)) and walker  Discharged with spouse

## 2019-11-22 NOTE — ANESTHESIA CARE TRANSFER NOTE
Patient: Douglas Jin    Procedure(s):  INCISION AND DRAINAGE RIGHT TOTAL KNEE WITH WOUND CLOSURE.  CLOSURE, WOUND, LOWER EXTREMITY, SECONDARY    Diagnosis: * No pre-op diagnosis entered *  Diagnosis Additional Information: No value filed.    Anesthesia Type:   General, LMA     Note:  Airway :Face Mask  Patient transferred to:PACU  Handoff Report: Identifed the Patient, Identified the Reponsible Provider, Reviewed the pertinent medical history, Discussed the surgical course, Reviewed Intra-OP anesthesia mangement and issues during anesthesia, Set expectations for post-procedure period and Allowed opportunity for questions and acknowledgement of understanding      Vitals: (Last set prior to Anesthesia Care Transfer)    CRNA VITALS  11/21/2019 2254 - 11/21/2019 2332      11/21/2019             Resp Rate (observed):  (!) 1                Electronically Signed By: ARNALDO Blake CRNA  November 21, 2019  11:32 PM

## 2019-11-26 LAB
BACTERIA SPEC CULT: NORMAL
Lab: NORMAL
SPECIMEN SOURCE: NORMAL

## 2019-11-27 LAB
BACTERIA SPEC CULT: ABNORMAL
BACTERIA SPEC CULT: ABNORMAL
Lab: ABNORMAL
SPECIMEN SOURCE: ABNORMAL

## 2019-12-06 LAB
BACTERIA SPEC CULT: NORMAL
Lab: NORMAL
SPECIMEN SOURCE: NORMAL

## 2019-12-11 ENCOUNTER — NURSE TRIAGE (OUTPATIENT)
Dept: NURSING | Facility: CLINIC | Age: 69
End: 2019-12-11

## 2019-12-12 NOTE — TELEPHONE ENCOUNTER
Wife Carlyn and Douglas reports that there is pinkish watery drainage from Douglas's right knee. This just happened today, while exercising. Earlier, his right knee burst like a faucet and left a puddle in their carpet. Drainage has since subsided for over 11 minutes. Douglas is now on his recliner with right leg elevated, with ongoing drainage from right knee. Patient is s/p right knee replacement surgery in November. Surgeon: Dr. Eric Pierce.      RN paged on call provider for MD Mario via answering service ([facility] [phone]) at 7 pm to call Douglas/Carlyn  back directly at [phone].    FNA advised patient to phone back FNA in 20 minutes if no response from on call MD and patient agreed.    Lula Jon RN/Agenda Nurse Advisor        Reason for Disposition    Dressing soaked with blood or body fluid (e.g., drainage)    Additional Information    Negative: [1] Major abdominal surgical incision AND [2] wound gaping open AND [3] visible internal organs    Negative: Sounds like a life-threatening emergency to the triager    Negative: [1] Bleeding from incision AND [2] won't stop after 10 minutes of direct pressure    Negative: [1] Widespread rash AND [2] bright red, sunburn-like    Negative: Severe pain in the incision    Negative: [1] Suture came out early AND [2] wound gaping AND [3] < 48 hours since sutures placed    Negative: [1] Incision gaping open AND [2] length of opening > 2 inches (5 cm)    Negative: Patient sounds very sick or weak to the triager    Negative: Sounds like a serious complication to the triager    Negative: Fever > 100.5 F (38.1 C)    Negative: [1] Incision looks infected (spreading redness, pain) AND [2] fever > 99.5 F (37.5 C)    Negative: [1] Incision looks infected (spreading redness, pain) AND [2] large red area (> 2 in. or 5 cm)    Negative: [1] Incision looks infected (spreading redness, pain) AND [2] face wound    Negative: [1] Red streak runs from the  incision AND [2] longer than 1 inch (2.5 cm)    Negative: [1] Pus or bad-smelling fluid draining from incision AND [2] no fever    Negative: [1] Post-op pain AND [2] not controlled with pain medications    Protocols used: POST-OP INCISION SYMPTOMS-A-AH

## 2021-05-05 ENCOUNTER — MEDICAL CORRESPONDENCE (OUTPATIENT)
Dept: HEALTH INFORMATION MANAGEMENT | Facility: CLINIC | Age: 71
End: 2021-05-05

## 2021-05-05 ENCOUNTER — HOSPITAL ENCOUNTER (OUTPATIENT)
Dept: LAB | Facility: CLINIC | Age: 71
Discharge: HOME | End: 2021-05-05
Attending: ORTHOPAEDIC SURGERY | Admitting: ORTHOPAEDIC SURGERY
Payer: MEDICARE

## 2021-05-05 DIAGNOSIS — Z47.89 AFTERCARE FOLLOWING SURGERY OF THE MUSCULOSKELETAL SYSTEM: Primary | ICD-10-CM

## 2021-05-05 DIAGNOSIS — M25.562 LEFT KNEE PAIN: ICD-10-CM

## 2021-05-05 DIAGNOSIS — M25.561 RIGHT KNEE PAIN: ICD-10-CM

## 2021-05-05 LAB
BASOPHILS # BLD AUTO: 0.1 10E9/L (ref 0–0.2)
BASOPHILS NFR BLD AUTO: 0.9 %
CRP SERPL-MCNC: 10.1 MG/L (ref 0–8)
DIFFERENTIAL METHOD BLD: ABNORMAL
EOSINOPHIL # BLD AUTO: 0.4 10E9/L (ref 0–0.7)
EOSINOPHIL NFR BLD AUTO: 3.7 %
ERYTHROCYTE [DISTWIDTH] IN BLOOD BY AUTOMATED COUNT: 22.1 % (ref 10–15)
ERYTHROCYTE [SEDIMENTATION RATE] IN BLOOD BY WESTERGREN METHOD: 35 MM/H (ref 0–20)
HCT VFR BLD AUTO: 35.3 % (ref 40–53)
HGB BLD-MCNC: 10.4 G/DL (ref 13.3–17.7)
IMM GRANULOCYTES # BLD: 0 10E9/L (ref 0–0.4)
IMM GRANULOCYTES NFR BLD: 0.4 %
LYMPHOCYTES # BLD AUTO: 3.1 10E9/L (ref 0.8–5.3)
LYMPHOCYTES NFR BLD AUTO: 31.3 %
MCH RBC QN AUTO: 23.8 PG (ref 26.5–33)
MCHC RBC AUTO-ENTMCNC: 29.5 G/DL (ref 31.5–36.5)
MCV RBC AUTO: 81 FL (ref 78–100)
MONOCYTES # BLD AUTO: 0.7 10E9/L (ref 0–1.3)
MONOCYTES NFR BLD AUTO: 6.7 %
NEUTROPHILS # BLD AUTO: 5.7 10E9/L (ref 1.6–8.3)
NEUTROPHILS NFR BLD AUTO: 57 %
NRBC # BLD AUTO: 0.1 10*3/UL
NRBC BLD AUTO-RTO: 1 /100
PLATELET # BLD AUTO: 310 10E9/L (ref 150–450)
RBC # BLD AUTO: 4.37 10E12/L (ref 4.4–5.9)
WBC # BLD AUTO: 10 10E9/L (ref 4–11)

## 2021-05-05 PROCEDURE — 86140 C-REACTIVE PROTEIN: CPT | Performed by: ORTHOPAEDIC SURGERY

## 2021-05-05 PROCEDURE — 85025 COMPLETE CBC W/AUTO DIFF WBC: CPT | Performed by: ORTHOPAEDIC SURGERY

## 2021-05-05 PROCEDURE — 85652 RBC SED RATE AUTOMATED: CPT | Performed by: ORTHOPAEDIC SURGERY

## 2021-05-05 PROCEDURE — 36415 COLL VENOUS BLD VENIPUNCTURE: CPT | Performed by: ORTHOPAEDIC SURGERY

## 2021-07-21 ENCOUNTER — MEDICAL CORRESPONDENCE (OUTPATIENT)
Dept: HEALTH INFORMATION MANAGEMENT | Facility: CLINIC | Age: 71
End: 2021-07-21

## 2021-07-22 DIAGNOSIS — Z11.59 ENCOUNTER FOR SCREENING FOR OTHER VIRAL DISEASES: ICD-10-CM

## 2021-07-29 ENCOUNTER — TELEPHONE (OUTPATIENT)
Dept: GENERAL RADIOLOGY | Facility: CLINIC | Age: 71
End: 2021-07-29

## 2021-08-04 ENCOUNTER — LAB (OUTPATIENT)
Dept: LAB | Facility: CLINIC | Age: 71
End: 2021-08-04
Attending: ORTHOPAEDIC SURGERY
Payer: MEDICARE

## 2021-08-04 ENCOUNTER — HOSPITAL ENCOUNTER (OUTPATIENT)
Dept: GENERAL RADIOLOGY | Facility: CLINIC | Age: 71
End: 2021-08-04
Attending: ORTHOPAEDIC SURGERY
Payer: MEDICARE

## 2021-08-04 VITALS — HEART RATE: 74 BPM | SYSTOLIC BLOOD PRESSURE: 133 MMHG | DIASTOLIC BLOOD PRESSURE: 72 MMHG

## 2021-08-04 DIAGNOSIS — Z11.59 ENCOUNTER FOR SCREENING FOR OTHER VIRAL DISEASES: ICD-10-CM

## 2021-08-04 DIAGNOSIS — Z47.89 UNSPECIFIED ORTHOPEDIC AFTERCARE: ICD-10-CM

## 2021-08-04 DIAGNOSIS — Z47.89 AFTERCARE FOLLOWING SURGERY OF THE MUSCULOSKELETAL SYSTEM: ICD-10-CM

## 2021-08-04 DIAGNOSIS — M25.569 PAIN IN UNSPECIFIED KNEE: ICD-10-CM

## 2021-08-04 LAB
% LINING CELLS, BODY FLUID: 17 %
APPEARANCE FLD: ABNORMAL
BASOPHILS # BLD AUTO: 0.1 10E3/UL (ref 0–0.2)
BASOPHILS NFR BLD AUTO: 1 %
COLOR FLD: YELLOW
CRP SERPL-MCNC: 17.2 MG/L (ref 0–8)
CRYSTALS SNV MICRO: ABNORMAL
EOSINOPHIL # BLD AUTO: 0.3 10E3/UL (ref 0–0.7)
EOSINOPHIL NFR BLD AUTO: 2 %
ERYTHROCYTE [DISTWIDTH] IN BLOOD BY AUTOMATED COUNT: 21 % (ref 10–15)
ERYTHROCYTE [SEDIMENTATION RATE] IN BLOOD BY WESTERGREN METHOD: 27 MM/HR (ref 0–20)
HCT VFR BLD AUTO: 37 % (ref 40–53)
HGB BLD-MCNC: 10.9 G/DL (ref 13.3–17.7)
IMM GRANULOCYTES # BLD: 0.1 10E3/UL
IMM GRANULOCYTES NFR BLD: 0 %
LYMPHOCYTES # BLD AUTO: 2.7 10E3/UL (ref 0.8–5.3)
LYMPHOCYTES NFR BLD AUTO: 24 %
LYMPHOCYTES NFR FLD MANUAL: 55 %
MCH RBC QN AUTO: 23.4 PG (ref 26.5–33)
MCHC RBC AUTO-ENTMCNC: 29.5 G/DL (ref 31.5–36.5)
MCV RBC AUTO: 80 FL (ref 78–100)
MONOCYTES # BLD AUTO: 0.5 10E3/UL (ref 0–1.3)
MONOCYTES NFR BLD AUTO: 5 %
MONOS+MACROS NFR FLD MANUAL: 24 %
NEUTROPHILS # BLD AUTO: 7.6 10E3/UL (ref 1.6–8.3)
NEUTROPHILS NFR BLD AUTO: 68 %
NEUTS BAND NFR FLD MANUAL: 4 %
NRBC # BLD AUTO: 0 10E3/UL
NRBC BLD AUTO-RTO: 0 /100
PLATELET # BLD AUTO: 260 10E3/UL (ref 150–450)
RBC # BLD AUTO: 4.65 10E6/UL (ref 4.4–5.9)
WBC # BLD AUTO: 11.2 10E3/UL (ref 4–11)
WBC # FLD AUTO: 2039 /UL

## 2021-08-04 PROCEDURE — 20610 DRAIN/INJ JOINT/BURSA W/O US: CPT | Mod: RT

## 2021-08-04 PROCEDURE — 85025 COMPLETE CBC W/AUTO DIFF WBC: CPT

## 2021-08-04 PROCEDURE — 89051 BODY FLUID CELL COUNT: CPT | Performed by: ORTHOPAEDIC SURGERY

## 2021-08-04 PROCEDURE — 86140 C-REACTIVE PROTEIN: CPT

## 2021-08-04 PROCEDURE — 87075 CULTR BACTERIA EXCEPT BLOOD: CPT | Performed by: ORTHOPAEDIC SURGERY

## 2021-08-04 PROCEDURE — 85652 RBC SED RATE AUTOMATED: CPT

## 2021-08-04 PROCEDURE — 250N000009 HC RX 250

## 2021-08-04 PROCEDURE — 87070 CULTURE OTHR SPECIMN AEROBIC: CPT | Performed by: ORTHOPAEDIC SURGERY

## 2021-08-04 PROCEDURE — 87205 SMEAR GRAM STAIN: CPT | Performed by: ORTHOPAEDIC SURGERY

## 2021-08-04 PROCEDURE — 36415 COLL VENOUS BLD VENIPUNCTURE: CPT

## 2021-08-04 PROCEDURE — 89060 EXAM SYNOVIAL FLUID CRYSTALS: CPT | Mod: TC | Performed by: ORTHOPAEDIC SURGERY

## 2021-08-04 RX ORDER — LIDOCAINE HYDROCHLORIDE 10 MG/ML
INJECTION, SOLUTION EPIDURAL; INFILTRATION; INTRACAUDAL; PERINEURAL
Status: COMPLETED
Start: 2021-08-04 | End: 2021-08-04

## 2021-08-04 RX ORDER — LIDOCAINE HYDROCHLORIDE 10 MG/ML
5 INJECTION, SOLUTION EPIDURAL; INFILTRATION; INTRACAUDAL; PERINEURAL ONCE
Status: COMPLETED | OUTPATIENT
Start: 2021-08-04 | End: 2021-08-04

## 2021-08-04 RX ADMIN — LIDOCAINE HYDROCHLORIDE 4 ML: 10 INJECTION, SOLUTION EPIDURAL; INFILTRATION; INTRACAUDAL; PERINEURAL at 10:35

## 2021-08-04 NOTE — PROGRESS NOTES
Pt was in Radiology today for a right knee aspiration. Pt tolerated ortho procedure well. Procedure was completed by STEPHANY SMITH. There were no complications during this procedure. Pt verbalized understanding of written and verbal instructions and left department in stable and satisfactory condition with wife. There is no evidence of bleeding or any other complications upon discharge.

## 2021-08-04 NOTE — PROGRESS NOTES
RADIOLOGY PROCEDURE NOTE  Patient name: Douglas Jin  MRN: [id number]  : 1950    Pre-procedure diagnosis: Right knee effusion  Post-procedure diagnosis: Same    Procedure Date/Time: 2021  10:40 AM  Procedure: Right knee aspiration  Estimated blood loss: None  Specimen(s) collected with description: 17 ml of clear yellow fluid removed.  The patient tolerated the procedure well with no immediate complications.  Significant findings:none    See imaging dictation for procedural details.    Provider name: Faheem Ruiz PA-C  Assistant(s):None

## 2021-08-05 LAB
GRAM STAIN RESULT: NORMAL
GRAM STAIN RESULT: NORMAL

## 2021-08-09 LAB — BACTERIA ASPIRATE CULT: NO GROWTH

## 2021-08-16 ENCOUNTER — HOSPITAL ENCOUNTER (OUTPATIENT)
Dept: NUCLEAR MEDICINE | Facility: CLINIC | Age: 71
Setting detail: NUCLEAR MEDICINE
End: 2021-08-16
Attending: ORTHOPAEDIC SURGERY
Payer: MEDICARE

## 2021-08-16 DIAGNOSIS — M25.569 PAIN IN UNSPECIFIED KNEE: ICD-10-CM

## 2021-08-16 PROCEDURE — 78315 BONE IMAGING 3 PHASE: CPT

## 2021-08-16 PROCEDURE — 343N000001 HC RX 343: Performed by: ORTHOPAEDIC SURGERY

## 2021-08-16 PROCEDURE — A9561 TC99M OXIDRONATE: HCPCS | Performed by: ORTHOPAEDIC SURGERY

## 2021-08-16 RX ADMIN — Medication 26 MILLICURIE: at 11:02

## 2021-08-18 LAB — BACTERIA ASPIRATE CULT: NORMAL

## (undated) DEVICE — TOURNIQUET CUFF 34" REPRO BROWN 60-7070-106

## (undated) DEVICE — CATH TRAY FOLEY COUDE SURESTEP 16FR W/DRN BAG LATEX A304416A

## (undated) DEVICE — GLOVE PROTEXIS POWDER FREE SMT 8.0  2D72PT80X

## (undated) DEVICE — BLADE SAW SAGITTAL STRK 19.5X90X1.27MM 2108-109-000S11

## (undated) DEVICE — SPONGE LAP 18X18" X8435

## (undated) DEVICE — SET HANDPIECE INTERPULSE W/COAXIAL FAN SPRAY TIP 0210118000

## (undated) DEVICE — SU VICRYL+ 1 MO-4 18" DYED VCP702D

## (undated) DEVICE — Device

## (undated) DEVICE — BAG CLEAR TRASH 1.3M 39X33" P4040C

## (undated) DEVICE — KIT CULTURE ESWAB AEROBE/ANAEROBE WHITE TOP R723480

## (undated) DEVICE — BLADE SAW SAGITTAL STRK 25X75X0.89MM SYS 6 6125-089-075

## (undated) DEVICE — LINEN HALF SHEET 5512

## (undated) DEVICE — LINEN ORTHO ACL PACK 5447

## (undated) DEVICE — SOL NACL 0.9% IRRIG 3000ML BAG 2B7477

## (undated) DEVICE — LINEN FULL SHEET 5511

## (undated) DEVICE — SPONGE LAP 4X18" X8415

## (undated) DEVICE — DRAIN HEMOVAC RESERVOIR KIT 10FR 1/8" MED 00-2550-002-10

## (undated) DEVICE — ESU GROUND PAD ADULT W/CORD E7507

## (undated) DEVICE — CATH FOLEY COUDE 16FR 5ML LATEX

## (undated) DEVICE — PACK TOTAL KNEE BOXED LATEX FREE PO15TKFCT

## (undated) DEVICE — BLADE GIGLI 20" 2808-02

## (undated) DEVICE — GLOVE PROTEXIS BLUE W/NEU-THERA 8.0  2D73EB80

## (undated) DEVICE — SOL WATER IRRIG 1000ML BOTTLE 2F7114

## (undated) DEVICE — HOOD FLYTE W/PEELAWAY 408-800-100

## (undated) DEVICE — SUTURE MONOCRYL+ 3-0 PS-1 27" UNDYED MCP936H

## (undated) DEVICE — PREP POVIDONE IODINE SCRUB 7.5% 4OZ APL82212

## (undated) DEVICE — GLOVE PROTEXIS POWDER FREE 7.5 ORTHOPEDIC 2D73ET75

## (undated) DEVICE — SUTURE VICRYL+ 0 CT-1 18" DYED VIO VCP740D

## (undated) DEVICE — SOL NACL 0.9% IRRIG 1000ML BOTTLE 2F7124

## (undated) DEVICE — GLOVE PROTEXIS BLUE W/NEU-THERA 7.0  2D73EB70

## (undated) DEVICE — CAST PADDING 6" STERILE 9046S

## (undated) DEVICE — BNDG COBAN 4"X5YDS STERILE

## (undated) DEVICE — PACK LOWER EXTREMITY RIDGES

## (undated) DEVICE — BONE CEMENT MIXEVAC III HI VAC KIT  0206-015-000

## (undated) DEVICE — DRAPE CONVERTORS U-DRAPE 60X72" 8476

## (undated) DEVICE — LINEN TOWEL PACK X10 5473

## (undated) DEVICE — GLOVE PROTEXIS POWDER FREE 7.0 ORTHOPEDIC 2D73ET70

## (undated) DEVICE — GOWN LG DISP 9515

## (undated) DEVICE — CLEANSER WOUND IRRISEPT 0.05% CHG IRRISEPT-403

## (undated) DEVICE — DRSG AQUACEL AG 3.5X12" HYDROFIBER 420670

## (undated) DEVICE — SU ETHILON 2-0 PS 18" 585H

## (undated) DEVICE — DRSG AQUACEL AG 3.5X9.75" HYDROFIBER 412011

## (undated) DEVICE — SU VICRYL+ 0 CT 36" DYED VCP358H

## (undated) DEVICE — APPLICATOR COTTON TIP 6"X2 STERILE LF 6012

## (undated) DEVICE — PREP CHLORAPREP 26ML TINTED ORANGE  260815

## (undated) DEVICE — SUCTION MANIFOLD NEPTUNE 2 SYS 4 PORT 0702-020-000

## (undated) DEVICE — CLEANSER JET LAVAGE IRRISEPT 0.05% CHG IRRISEPT45USA

## (undated) DEVICE — SUTURE MONOCRYL+ 2-0 CT-1 36" UNDYED MCP945H

## (undated) DEVICE — BLADE SAW RECIP STRK LONG 70X12.5X0.9MM 0277-096-278

## (undated) DEVICE — DRAPE STERI U 1015

## (undated) DEVICE — DRAPE STOCKINETTE IMPERVIOUS 12" 1587

## (undated) DEVICE — TUBE CULTURE AEROBIC/ANAEROBIC W/O SWABS A.C.T.I.1. 12401

## (undated) DEVICE — PREP POVIDONE IODINE SOLUTION 10% 4OZ

## (undated) RX ORDER — ONDANSETRON 2 MG/ML
INJECTION INTRAMUSCULAR; INTRAVENOUS
Status: DISPENSED
Start: 2019-11-21

## (undated) RX ORDER — CEFAZOLIN SODIUM IN 0.9 % NACL 3 G/100 ML
INTRAVENOUS SOLUTION, PIGGYBACK (ML) INTRAVENOUS
Status: DISPENSED
Start: 2019-11-21

## (undated) RX ORDER — CEFAZOLIN SODIUM 1 G/3ML
INJECTION, POWDER, FOR SOLUTION INTRAMUSCULAR; INTRAVENOUS
Status: DISPENSED
Start: 2019-11-12

## (undated) RX ORDER — GLYCOPYRROLATE 0.2 MG/ML
INJECTION INTRAMUSCULAR; INTRAVENOUS
Status: DISPENSED
Start: 2019-11-12

## (undated) RX ORDER — EPHEDRINE SULFATE 50 MG/ML
INJECTION, SOLUTION INTRAMUSCULAR; INTRAVENOUS; SUBCUTANEOUS
Status: DISPENSED
Start: 2019-11-21

## (undated) RX ORDER — PHENYLEPHRINE HCL IN 0.9% NACL 1 MG/10 ML
SYRINGE (ML) INTRAVENOUS
Status: DISPENSED
Start: 2019-11-21

## (undated) RX ORDER — PROPOFOL 10 MG/ML
INJECTION, EMULSION INTRAVENOUS
Status: DISPENSED
Start: 2019-11-21

## (undated) RX ORDER — VANCOMYCIN HYDROCHLORIDE 1 G/20ML
INJECTION, POWDER, LYOPHILIZED, FOR SOLUTION INTRAVENOUS
Status: DISPENSED
Start: 2019-11-21

## (undated) RX ORDER — LIDOCAINE HYDROCHLORIDE 10 MG/ML
INJECTION, SOLUTION EPIDURAL; INFILTRATION; INTRACAUDAL; PERINEURAL
Status: DISPENSED
Start: 2019-11-21

## (undated) RX ORDER — ONDANSETRON 2 MG/ML
INJECTION INTRAMUSCULAR; INTRAVENOUS
Status: DISPENSED
Start: 2019-11-12

## (undated) RX ORDER — FENTANYL CITRATE 50 UG/ML
INJECTION, SOLUTION INTRAMUSCULAR; INTRAVENOUS
Status: DISPENSED
Start: 2019-11-12

## (undated) RX ORDER — ACETAMINOPHEN 325 MG/1
TABLET ORAL
Status: DISPENSED
Start: 2019-11-12

## (undated) RX ORDER — DEXAMETHASONE SODIUM PHOSPHATE 4 MG/ML
INJECTION, SOLUTION INTRA-ARTICULAR; INTRALESIONAL; INTRAMUSCULAR; INTRAVENOUS; SOFT TISSUE
Status: DISPENSED
Start: 2019-11-21

## (undated) RX ORDER — PHENYLEPHRINE HCL IN 0.9% NACL 1 MG/10 ML
SYRINGE (ML) INTRAVENOUS
Status: DISPENSED
Start: 2019-11-12

## (undated) RX ORDER — PROPOFOL 10 MG/ML
INJECTION, EMULSION INTRAVENOUS
Status: DISPENSED
Start: 2019-11-12

## (undated) RX ORDER — EPHEDRINE SULFATE 50 MG/ML
INJECTION, SOLUTION INTRAMUSCULAR; INTRAVENOUS; SUBCUTANEOUS
Status: DISPENSED
Start: 2019-11-12

## (undated) RX ORDER — FENTANYL CITRATE 50 UG/ML
INJECTION, SOLUTION INTRAMUSCULAR; INTRAVENOUS
Status: DISPENSED
Start: 2019-11-21

## (undated) RX ORDER — CELECOXIB 200 MG/1
CAPSULE ORAL
Status: DISPENSED
Start: 2019-11-12

## (undated) RX ORDER — HYDROMORPHONE HYDROCHLORIDE 1 MG/ML
INJECTION, SOLUTION INTRAMUSCULAR; INTRAVENOUS; SUBCUTANEOUS
Status: DISPENSED
Start: 2019-11-21

## (undated) RX ORDER — GLYCOPYRROLATE 0.2 MG/ML
INJECTION INTRAMUSCULAR; INTRAVENOUS
Status: DISPENSED
Start: 2019-11-21

## (undated) RX ORDER — DEXAMETHASONE SODIUM PHOSPHATE 4 MG/ML
INJECTION, SOLUTION INTRA-ARTICULAR; INTRALESIONAL; INTRAMUSCULAR; INTRAVENOUS; SOFT TISSUE
Status: DISPENSED
Start: 2019-11-12

## (undated) RX ORDER — NEOSTIGMINE METHYLSULFATE 1 MG/ML
VIAL (ML) INJECTION
Status: DISPENSED
Start: 2019-11-12

## (undated) RX ORDER — LIDOCAINE HYDROCHLORIDE 10 MG/ML
INJECTION, SOLUTION EPIDURAL; INFILTRATION; INTRACAUDAL; PERINEURAL
Status: DISPENSED
Start: 2019-11-12

## (undated) RX ORDER — CEFAZOLIN SODIUM IN 0.9 % NACL 3 G/100 ML
INTRAVENOUS SOLUTION, PIGGYBACK (ML) INTRAVENOUS
Status: DISPENSED
Start: 2019-11-12

## (undated) RX ORDER — KETOROLAC TROMETHAMINE 30 MG/ML
INJECTION, SOLUTION INTRAMUSCULAR; INTRAVENOUS
Status: DISPENSED
Start: 2019-11-21